# Patient Record
Sex: FEMALE | Race: WHITE | NOT HISPANIC OR LATINO | Employment: UNEMPLOYED | URBAN - METROPOLITAN AREA
[De-identification: names, ages, dates, MRNs, and addresses within clinical notes are randomized per-mention and may not be internally consistent; named-entity substitution may affect disease eponyms.]

---

## 2021-11-30 ENCOUNTER — TRANSCRIBE ORDERS (OUTPATIENT)
Dept: SPEECH THERAPY | Facility: HOSPITAL | Age: 46
End: 2021-11-30

## 2021-11-30 DIAGNOSIS — Z46.89 WHEELCHAIR FITTING OR ADJUSTMENT: Primary | ICD-10-CM

## 2021-12-08 ENCOUNTER — HOSPITAL ENCOUNTER (OUTPATIENT)
Dept: PHYSICAL THERAPY | Facility: HOSPITAL | Age: 46
Setting detail: THERAPIES SERIES
Discharge: HOME | End: 2021-12-08

## 2021-12-08 DIAGNOSIS — Z74.09 IMPAIRED FUNCTIONAL MOBILITY, BALANCE, GAIT, AND ENDURANCE: ICD-10-CM

## 2021-12-08 DIAGNOSIS — R29.6 FREQUENT FALLS: ICD-10-CM

## 2021-12-08 DIAGNOSIS — Z46.89 FITTING AND ADJUSTMENT OF WHEELCHAIR: Primary | ICD-10-CM

## 2021-12-08 DIAGNOSIS — R26.89 IMBALANCE: ICD-10-CM

## 2021-12-08 DIAGNOSIS — M54.50 LOW BACK PAIN, UNSPECIFIED BACK PAIN LATERALITY, UNSPECIFIED CHRONICITY, UNSPECIFIED WHETHER SCIATICA PRESENT: ICD-10-CM

## 2021-12-08 PROCEDURE — 97162 PT EVAL MOD COMPLEX 30 MIN: CPT

## 2021-12-08 NOTE — THERAPY EVALUATION
"    .Outpatient Physical Therapy Neuro Initial Evaluation  [facility]     Patient Name: Kimberley Andrade  : 1975  MRN: [id number]  Today's Date: 2021      Visit Date: 2021    There is no problem list on file for this patient.       Past Medical History:   Diagnosis Date   • Arthritis    • COPD (chronic obstructive pulmonary disease) ([facility])    • Depression    • Diabetes mellitus ([facility])    • Hypertension         Past Surgical History:   Procedure Laterality Date   •  SECTION      x 3    • HERNIA REPAIR      per pt \"4 times\"         Visit Dx:     ICD-10-CM ICD-9-CM   1. Fitting and adjustment of wheelchair  Z46.89 V53.8   2. Imbalance  R26.89 781.2   3. Frequent falls  R29.6 V15.88   4. Impaired functional mobility, balance, gait, and endurance  Z74.09 V49.89   5. Low back pain, unspecified back pain laterality, unspecified chronicity, unspecified whether sciatica present  M54.50 724.2        Patient History     Row Name 21 0935             History    Chief Complaint Balance Problems; Difficulty Walking; Difficulty with daily activities; Falls/history of falls; Fatigue/poor endurance; Impaired sensation; Muscle weakness  -      Date Current Problem(s) Began 10/25/21  referral date  -      Brief Description of Current Complaint Pt is referred to PT for wheelchair mobility assessment. She reports decreased mobility for several years and pt reports she struggles with her ADL's. Per pt her medical history includes low back pain, arthritis, painful knees, COPD with recent diagnosis of sleep apnea, diabetes with neuropathy, history of \"nerve damage L LE from epidural\" with childbirth. Pt reports at least 16 falls this year at home due to loss of balance.  -GERMAN      Patient/Caregiver Goals Improve mobility  obtain power wheelchair/scooter  -GERMAN      Hand Dominance right-handed  -GERMAN      Occupation/sports/leisure activities Pt has not worked since   -GERMAN              Pain     Pain Location " "Back; Knee  low back and B knees  -GERMAN      Pain at Present 7  low back and R knee; 3-4 L knee  -GERMAN      Tolerance Time- Standing Pt reports limited standing time due to pain especially with ADLs and reports she sits to cook and wash dishes.  -GERMAN              Fall Risk Assessment    Any falls in the past year: Yes  -GERMAN      Number of falls reported in the last 12 months 16  -GERMAN      Factors that contributed to the fall: Lost balance; Didn't use assistive device; Fatigue  -GERMAN      Does patient have a fear of falling Yes (comment)  -GERMAN              Daily Activities    Primary Language English  -GERMAN      Are you able to read Yes  -GERMAN      Are you able to write Yes  -GERMAN      How does patient learn best? Listening  -GERMAN      Teaching needs identified Falls Prevention; Home Safety  -GERMAN      Patient is concerned about/has problems with Climbing Stairs; Difficulty with self care (i.e. bathing, dressing, toileting:; Performing home management (household chores, shopping, care of dependents); Repetitive movements of the hand, arm, shoulder; Standing; Transfers (getting out of a chair, bed); Walking  -GERMAN      Does patient have problems with the following? Anxiety; Panic Attack  -GERMAN      Pt Participated in POC and Goals Yes  -GERMAN              Safety    Are you being hurt, hit, or frightened by anyone at home or in your life? No  -GERMAN      Are you being neglected by a caregiver No  -GERMAN            User Key  (r) = Recorded By, (t) = Taken By, (c) = Cosigned By    Initials Name Provider Type    Misty Carter PT Physical Therapist                     PT Neuro     Row Name 12/08/21 5827             Subjective Comments    Subjective Comments Pt reports she is fearful of falling. Pt c/o feeling \"dizzy\" with her describing it as lightheaded and off balance. She sits alot at home because she is afraid to get up. She said she sees a psychiatrist for her anxiety and \"PTSD\".  -GERMAN              Precautions and Contraindications    " "Precautions/Limitations fall precautions  -GERMAN              Subjective Pain    Able to rate subjective pain? yes  -GERMAN      Pre-Treatment Pain Level 7  -GERMAN      Post-Treatment Pain Level 7  -GERMAN      Subjective Pain Comment low back pain and R knee; L knee \"3-4\"  -GERMAN              Home Living    Current Living Arrangements home/apartment/condo  Pt lives in a 2 story town home with  and son. She sleeps on first floor recliner or couch because of difficulty getting up/down stairs to 2nd floor bedroom.  -GERMAN      Home Accessibility wheelchair accessible; stairs within home  stairs to 2nd floor bedroom; no steps to enter.  -GERMAN              Vision-Basic Assessment    Current Vision No visual deficits  -GERMAN              Cognition    Overall Cognitive Status WFL  -GERMAN      Memory Appears intact  -GERMAN      Orientation Level Oriented X4  -GERMAN      Safety Judgment Good awareness of safety precautions  -GERMAN      Deficits Fully aware of deficits  -GERMAN              Sensation    Light Touch Partial deficits in the LLE  lateral side of L LE from \"nerve damage from epidural\"  -GERMAN      Additional Comments Pt c/o B hands \"going numb\" at different times of the day.  -GERMAN              Posture/Observations    Alignment Options Forward head; Rounded shoulders; Lumbar lordosis  -GERMAN      Forward Head Mild; Moderate  -GERMAN      Rounded Shoulders Moderate  -GERMAN      Lumbar lordosis Decreased; Mild; Moderate; Sitting posture  -GERMAN      Posture/Observations Comments Pt is obese female with stated weight of ~ 331 lbs. She was pushed up to rehab waiting room in transport chair by her son.  -GERMAN              General ROM    GENERAL ROM COMMENTS AROM WFL all extremities except in sitting hip flexion ~ 1/4 range due to large abdominal girth.  -GERMAN              MMT (Manual Muscle Testing)    General MMT Comments B UE's 4/5 strength. B LE's hip flexion 3-/5, B knee flexion 3+/5, B knee extension 4/5, R ankle DF and inversion/eversion 3+/5, L ankle DF and " inversion/eversion 4/5  -GERMAN              Bed Mobility    Comment (Bed Mobility) Pt said she sleeps in recliner or on couch because she cannot get upstairs to her bedroom.  -GERMAN              Transfers    Bed-Chair [place] (Transfers) standby assist  -GERMAN      Chair-Bed [place] (Transfers) standby assist  -GERMAN      Transfers, Bed-Chair-Bed, Assist Device --  stand pivot  -GERMAN      Sit-Stand [place] (Transfers) standby assist  -GERMAN      Stand-Sit [place] (Transfers) standby assist  -GERMAN      Transfers, Sit-Stand-Sit, Assist Device rolling walker  -GERMAN      Transfer, Safety Issues balance decreased during turns; weight-shifting ability decreased  -GERMAN      Transfer, Impairments strength decreased; impaired balance; pain  -GERMAN      Comment (Transfers) To stand from armchair and armless chair using UE's to assist slow and struggles.  -GERMAN              Gait/Stairs (Locomotion)    [place] Level (Gait) standby assist; modified independence  -GERMAN      Assistive Device (Gait) walker, front-wheeled  -GERMAN      Distance in Feet (Gait) 20', 10'  -GERMAN      Pattern (Gait) step-through  -GERMAN      Deviations/Abnormal Patterns (Gait) bilateral deviations; base of support, wide; weight shifting decreased; stride length decreased; gait speed decreased  -GERMAN      Bilateral Gait Deviations forward flexed posture; heel strike decreased  SOA with ambulation  -GERMAN      Comment (Gait/Stairs) see Bgetti and TUG  -GERMAN            User Key  (r) = Recorded By, (t) = Taken By, (c) = Cosigned By    Initials Name Provider Type    Misty Carter PT Physical Therapist                        Therapy Education  Education Details: Discussed safety at home to prevent falls and possible safer type of mobility for pt.  Given: Symptoms/condition management, Fall prevention and home safety  How Provided: Verbal  Provided to: Patient  Level of Understanding: Verbalized     PT OP Goals     Row Name 12/08/21 8281          Long Term Goals    LTG  "Date to Achieve 01/06/22  -GERMAN     LTG 1 Pt to be evaluated for appropriate wheeled mobility device to allow pt to be independent in her home environment.  -GERMAN            Time Calculation    PT Goal Re-Cert Due Date 01/06/22  -GERMAN           User Key  (r) = Recorded By, (t) = Taken By, (c) = Cosigned By    Initials Name Provider Type    Misty Carter, PT Physical Therapist                 PT Assessment/Plan     Row Name 12/08/21 1529          PT Assessment    Functional Limitations Decreased safety during functional activities; Impaired gait; Limitation in home management; Limitations in community activities; Limitations in functional capacity and performance; Performance in leisure activities; Performance in self-care ADL  -GERMAN     Impairments Balance; Endurance; Gait; Muscle strength; Pain; Sensation  -GERMAN     Assessment Comments Pt is a 46 y/o female referred to PT for wheelchair mobility assessment who reports decreased mobility for several years. Per pt, her medical history includes low back pain, arthritis, painful knees, COPD with recent diagnosis of sleep apnea, diabetes with neuropathy, history of \"nerve damage L LE from epidural\" with childbirth. Pt reports she struggles with her ADL's. Pt reports at least 16 falls this year at home due to loss of balance. Pt has some weakness of B hip flexors. Pt's stated weight is ~ 331 lbs. Pt has deficits with ambulation and balance as indicated by the low scores on the Tinetti balance test. Her time on the TUG was low. The outcome measures indicate a high risk for falls. Pt is unable to timely or safely use an upright device (cane or walker) for mobility inside her home. Next treatment session will be utilized as a followup to the initial evaluation to assess pt's ability for wheeled mobility with a durable medical [employer] representative.  -GERMAN     Please refer to paper survey for additional self-reported information Yes  -GERMAN     Rehab Potential Good  -GERMAN     " "Patient/caregiver participated in establishment of treatment plan and goals Yes  -GERMAN     Patient would benefit from skilled therapy intervention Yes  -GERMAN            PT Plan    Predicted Duration of Therapy Intervention (PT) see for one additional visit  -GERMAN     Planned CPT's? PT EVAL MOD COMPLELITY: 22311; PT WHEELCHAIR MGMT EA 15 MIN: 89737  -GERMAN     Physical Therapy Interventions (Optional Details) wheelchair management/propulsion training  -           User Key  (r) = Recorded By, (t) = Taken By, (c) = Cosigned By    Initials Name Provider Type    Misty Carter, PT Physical Therapist                    OP Exercises     Row Name 12/08/21 0935             Subjective Comments    Subjective Comments Pt reports she is fearful of falling. Pt c/o feeling \"dizzy\" with her describing it as lightheaded and off balance. She sits alot at home because she is afraid to get up. She said she sees a psychiatrist for her anxiety and \"PTSD\".  -GERMNA              Subjective Pain    Able to rate subjective pain? yes  -GERMAN      Pre-Treatment Pain Level 7  -GERMAN      Post-Treatment Pain Level 7  -GERMAN      Subjective Pain Comment low back pain and R knee; L knee \"3-4\"  -            User Key  (r) = Recorded By, (t) = Taken By, (c) = Cosigned By    Initials Name Provider Type    Misty Carter, PT Physical Therapist                            Outcome Measure Options: Tinetti, Timed Up and Go (TUG)  Tinetti Assessment  Tinetti Assessment: yes  Sitting Balance: Steady,safe  Arises: Able, uses arms  Attempts to Rise: Able in 1 attempt  Immediate Standing Balance (first 5 sec): Steady without support  Standing Balance: Steady, stance > 4 inch CHRISSIE & requires support  Sternal Nudge (feet close together): Begins to fall  Eyes Closed (feet close together): Steady  Turning 360 Degrees- Steps: Discontinuous steps  Turning 360 Degrees- Steadiness: Unsteady (staggers, grabs)  Sitting Down: Uses arms or not a smooth motion  Tinetti Balance " "Score: 9  Gait Initiation (immediate after told \"go\"): No hesitancy  Step Length- Right Swing: Right swing foot passes Left stance leg  Step Length- Left Swing: Left swing foot passes Right  Foot Clearance- Right Foot: Right foot completely clears floor  Foot Clearance- Left Foot: Left foot completely clears floor  Step Symmetry: Right and Left step length equal  Step Continuity: Steps appear continuous  Path (excursion): Mild/moderate deviation or use of aid  Trunk: Marked sway or uses device  Base of Support: Heels apart  Gait Score: 8  Tinetti Total Score: 17  Tinetti Assistive Device: rolling walker  Timed Up and Go (TUG)  TUG Test 1: 31 seconds (using rolling walker; SOA after ambulation)    Time Calculation:   Start Time: 0935  Stop Time: 1020  Time Calculation (min): 45 min  Total Timed Code Minutes- PT: 45 minute(s)  Untimed Charges  PT Eval/Re-eval Minutes: 45  Total Minutes  Untimed Charges Total Minutes: 45   Total Minutes: 45   Therapy Charges for Today     Code Description Service Date Service Provider Modifiers Qty    23054405780 HC PT EVAL MOD COMPLEXITY 4 12/8/2021 Misty Mariano, PT GP 1          PT G-Codes  Outcome Measure Options: Tinetti, Timed Up and Go (TUG)  Tinetti Total Score: 17  TUG Test 1: 31 seconds (using rolling walker; SOA after ambulation)     Patient was wearing a face mask during this therapy encounter. Therapist used appropriate personal protective equipment including mask and gloves.  Mask used was standard procedure mask. Appropriate PPE was worn during the entire therapy session. Hand hygiene was completed before and after therapy session. Patient is not in enhanced droplet precautions.         Misty Mariano, PT  12/8/2021     "

## 2021-12-16 ENCOUNTER — APPOINTMENT (OUTPATIENT)
Dept: PHYSICAL THERAPY | Facility: HOSPITAL | Age: 46
End: 2021-12-16

## 2021-12-27 ENCOUNTER — HOSPITAL ENCOUNTER (OUTPATIENT)
Dept: PHYSICAL THERAPY | Facility: HOSPITAL | Age: 46
Setting detail: THERAPIES SERIES
Discharge: HOME | End: 2021-12-27

## 2021-12-27 DIAGNOSIS — R29.6 FREQUENT FALLS: ICD-10-CM

## 2021-12-27 DIAGNOSIS — M54.50 LOW BACK PAIN, UNSPECIFIED BACK PAIN LATERALITY, UNSPECIFIED CHRONICITY, UNSPECIFIED WHETHER SCIATICA PRESENT: ICD-10-CM

## 2021-12-27 DIAGNOSIS — Z74.09 IMPAIRED FUNCTIONAL MOBILITY, BALANCE, GAIT, AND ENDURANCE: ICD-10-CM

## 2021-12-27 DIAGNOSIS — Z46.89 FITTING AND ADJUSTMENT OF WHEELCHAIR: Primary | ICD-10-CM

## 2021-12-27 DIAGNOSIS — R26.89 IMBALANCE: ICD-10-CM

## 2021-12-27 PROCEDURE — 97542 WHEELCHAIR MNGMENT TRAINING: CPT

## 2021-12-27 NOTE — THERAPY DISCHARGE NOTE
"      Outpatient Physical Therapy Neuro Treatment Note/Discharge Summary  [facility]     Patient Name: Kimberley Andrade  : 1975  MRN: [id number]  Today's Date: 2021      Visit Date: 2021    Visit Dx:    ICD-10-CM ICD-9-CM   1. Fitting and adjustment of wheelchair  Z46.89 V53.8   2. Imbalance  R26.89 781.2   3. Frequent falls  R29.6 V15.88   4. Impaired functional mobility, balance, gait, and endurance  Z74.09 V49.89   5. Low back pain, unspecified back pain laterality, unspecified chronicity, unspecified whether sciatica present  M54.50 724.2       There is no problem list on file for this patient.            PT Neuro     Row Name 21 1500             Subjective Comments    Subjective Comments Pt again reiterated her fear of walking because she has had several falls and c/o \"dizziness\". \"Today [birth year] my birthday.\"  -GERMAN              Precautions and Contraindications    Precautions/Limitations fall precautions  -GERMAN              Subjective Pain    Able to rate subjective pain? yes  -GERMAN      Pre-Treatment Pain Level 7  -GERMAN      Post-Treatment Pain Level 7  -GERMAN      Subjective Pain Comment Pre Pain: Low back pain radiating down L LE to foot; post w/c propulsion: B knee pain when attempted to propel chair with LE's (feet on floor) and \"7\" pain B shoulders and upper arms.  -GERMAN              Cognition    Overall Cognitive Status WFL  -GERMAN      Memory Appears intact  -GERMAN      Orientation Level Oriented X4  -GERMAN      Safety Judgment Good awareness of safety precautions  -GERMAN      Deficits Fully aware of deficits  -GERMAN              Posture/Observations    Forward Head Mild; Moderate  -GERMAN      Rounded Shoulders Moderate  -GERMAN      Lumbar lordosis Decreased; Mild; Moderate; Sitting posture  -GERMAN      Posture/Observations Comments Pt is obese female with reports new stated weight of ~ 352 lbs since PT initial evaluation. Pt states she was weighed at a doctor's appt since PT evaluation. She was pushed up to rehab " "waiting room in transport chair by her son.  -GERMAN              Mobility    Additional Documentation Wheelchair Mobility/Management (Group)  -GERMAN              Transfers    Bed-Chair [place] (Transfers) contact guard  -GERMAN      Chair-Bed [place] (Transfers) contact guard  -GERMAN      Transfers, Bed-Chair-Bed, Assist Device --  stand piovt  -GERMAN      Sit-Stand [place] (Transfers) contact guard  -GERMAN      Stand-Sit [place] (Transfers) contact guard  -GERMAN      Transfer, Safety Issues balance decreased during turns; weight-shifting ability decreased  -GERMAN      Transfer, Impairments strength decreased; impaired balance; pain  -GERMAN      Comment (Transfers) Pt struggles and is slow to transfer chair to chair  -GERMAN              Wheelchair Mobility/Management    Mobility Activities (Wheelchair) forward propulsion; steering; turning  -GERMAN      Forward Propulsion [place] (Wheelchair) standby assist  -GERMAN      Steering [place] (Wheelchair) standby assist  -GERMAN      Turning [place] (Wheelchair) contact guard; minimum assist (75% patient effort)  slow and difficult  -GERMAN      Distance Propelled in Feet (Wheelchair) Pt propelled an optimally configured manual wheelchair 18' on low pile carpet in 1 minute 5 seconds (65 seconds total) with great labor and complaints of increased pain in B knees and B shoulders and upper arms. Pain was not present in shoulders and upper arms initially but was rated \"7\" following w/c propulsion.  -GERMAN            User Key  (r) = Recorded By, (t) = Taken By, (c) = Cosigned By    Initials Name Provider Type    Misty Carter, PT Physical Therapist                         PT Assessment/Plan     Row Name 12/27/21 5300          PT Assessment    Assessment Comments Based on the Initial Evaluation (including the Tinetti balance test and the TUG), pt is not able to safely and timely use an upright mobility device. Pt struggled and labored to transfer out of wheelchair. Pt was only able to " "propel an optimally configured manual wheelchair 18' in 1 minute and 5 seconds (65 seconds total) due to increased pain of B shoulders and upper arms and B knees. Due to pt's difficulty with transfers and her obesity, transfers in and out of a scooter would not be safe. An upright mobility device (cane or walker), optimally configured manual wheelchair and a scooter are not safe or timely for this pt to use for ADL's. Therefore, a Ciarra HD with captain's seat is recommended. The pt has the mental capabilities to operate a power wheelchair safely. The Ciarra HD power wheelchair will allow the pt to remain independent in her home environment by helping her to complete her ADL's. RENATO Weiss, a [employer]  was present to aid in the determination of appropriate mobility device.  -GERMAN           User Key  (r) = Recorded By, (t) = Taken By, (c) = Cosigned By    Initials Name Provider Type    Misty Carter, PT Physical Therapist                      OP Exercises     Row Name 12/27/21 1500             Subjective Comments    Subjective Comments Pt again reiterated her fear of walking because she has had several falls and c/o \"dizziness\". \"Today is my birthday.\"  -GERMAN              Subjective Pain    Able to rate subjective pain? yes  -GERMAN      Pre-Treatment Pain Level 7  -GERMAN      Post-Treatment Pain Level 7  -GERMAN      Subjective Pain Comment Pre Pain: Low back pain radiating down L LE to foot; post w/c propulsion: B knee pain when attempted to propel chair with LE's (feet on floor) and \"7\" pain B shoulders and upper arms.  -GERMAN            User Key  (r) = Recorded By, (t) = Taken By, (c) = Cosigned By    Initials Name Provider Type    Misty Carter, PT Physical Therapist                               PT OP Goals     Row Name 12/27/21 1500          Long Term Goals    LTG 1 Pt to be evaluated for appropriate wheeled mobility device to allow pt to be independent in her home environment.  -GERMAN     LTG 1 " Progress Met  -GERMAN           User Key  (r) = Recorded By, (t) = Taken By, (c) = Cosigned By    Initials Name Provider Type    Misty Carter, PT Physical Therapist                Therapy Education  Education Details: Education on various wheeled mobility devices.  Given: Mobility training, Fall prevention and home safety  How Provided: Verbal  Provided to: Patient, Caregiver  Level of Understanding: Verbalized            Time Calculation:   Start Time: 1500  Stop Time: 1542  Time Calculation (min): 42 min  Total Timed Code Minutes- PT: 42 minute(s)  Timed Charges  66199 - Wheelchair Management Trainin  Total Minutes  Timed Charges Total Minutes: 42   Total Minutes: 42     Therapy Charges for Today     Code Description Service Date Service Provider Modifiers Qty    04290461588 HC PT WHEELCHAIR MGMT EA 15 MIN 2021 Misty Mariano, PT GP 3                OP PT Discharge Summary  Date of Discharge: 21  Reason for Discharge: All goals achieved  Outcomes Achieved: Able to achieve all goals within established timeline  Discharge Destination: Home without follow-up    Patient was wearing a face mask during this therapy encounter. Therapist used appropriate personal protective equipment including mask and gloves.  Mask used was standard procedure mask. Appropriate PPE was worn during the entire therapy session. Hand hygiene was completed before and after therapy session. Patient is not in enhanced droplet precautions.         Misty Mariano, PT  2021

## 2022-01-25 ENCOUNTER — CONSULT (OUTPATIENT)
Dept: BARIATRICS/WEIGHT MGMT | Facility: CLINIC | Age: 47
End: 2022-01-25

## 2022-01-25 VITALS
RESPIRATION RATE: 18 BRPM | HEART RATE: 72 BPM | BODY MASS INDEX: 47.09 KG/M2 | HEIGHT: 66 IN | DIASTOLIC BLOOD PRESSURE: 78 MMHG | SYSTOLIC BLOOD PRESSURE: 128 MMHG | OXYGEN SATURATION: 96 % | WEIGHT: 293 LBS | TEMPERATURE: 97.4 F

## 2022-01-25 DIAGNOSIS — Z01.818 PRE-OP EVALUATION: ICD-10-CM

## 2022-01-25 DIAGNOSIS — G47.33 OSA (OBSTRUCTIVE SLEEP APNEA): ICD-10-CM

## 2022-01-25 DIAGNOSIS — E66.9 DIABETES MELLITUS TYPE 2 IN OBESE: ICD-10-CM

## 2022-01-25 DIAGNOSIS — I10 ESSENTIAL HYPERTENSION: ICD-10-CM

## 2022-01-25 DIAGNOSIS — E78.5 DYSLIPIDEMIA: ICD-10-CM

## 2022-01-25 DIAGNOSIS — K21.9 GASTROESOPHAGEAL REFLUX DISEASE, UNSPECIFIED WHETHER ESOPHAGITIS PRESENT: ICD-10-CM

## 2022-01-25 DIAGNOSIS — E11.69 DIABETES MELLITUS TYPE 2 IN OBESE: ICD-10-CM

## 2022-01-25 DIAGNOSIS — E66.01 MORBID OBESITY WITH BMI OF 50.0-59.9, ADULT: Primary | ICD-10-CM

## 2022-01-25 PROBLEM — Z87.891 QUIT SMOKING WITHIN PAST YEAR: Status: ACTIVE | Noted: 2022-01-25

## 2022-01-25 PROBLEM — E28.2 PCOS (POLYCYSTIC OVARIAN SYNDROME): Status: ACTIVE | Noted: 2022-01-25

## 2022-01-25 PROBLEM — H81.09 MENIERE DISEASE: Status: ACTIVE | Noted: 2022-01-25

## 2022-01-25 PROBLEM — Z86.19 HISTORY OF HELICOBACTER PYLORI INFECTION: Status: ACTIVE | Noted: 2022-01-25

## 2022-01-25 PROBLEM — R12 HEARTBURN: Status: ACTIVE | Noted: 2022-01-25

## 2022-01-25 PROBLEM — M25.50 MULTIPLE JOINT PAIN: Status: ACTIVE | Noted: 2022-01-25

## 2022-01-25 PROBLEM — F41.9 ANXIETY: Status: ACTIVE | Noted: 2022-01-25

## 2022-01-25 PROBLEM — Z86.718 HISTORY OF DVT (DEEP VEIN THROMBOSIS): Status: ACTIVE | Noted: 2022-01-25

## 2022-01-25 PROBLEM — Z71.3 DIETARY COUNSELING: Status: ACTIVE | Noted: 2022-01-25

## 2022-01-25 PROBLEM — R12 HEARTBURN: Status: RESOLVED | Noted: 2022-01-25 | Resolved: 2022-01-25

## 2022-01-25 PROBLEM — K76.0 FATTY LIVER: Status: ACTIVE | Noted: 2022-01-25

## 2022-01-25 PROCEDURE — 99205 OFFICE O/P NEW HI 60 MIN: CPT | Performed by: NURSE PRACTITIONER

## 2022-01-25 RX ORDER — ONDANSETRON 4 MG/1
4 TABLET, ORALLY DISINTEGRATING ORAL EVERY 8 HOURS PRN
COMMUNITY
Start: 2021-11-03 | End: 2023-02-21

## 2022-01-25 RX ORDER — ATORVASTATIN CALCIUM 80 MG/1
80 TABLET, FILM COATED ORAL DAILY
COMMUNITY
Start: 2022-01-20

## 2022-01-25 RX ORDER — HYDROXYZINE HYDROCHLORIDE 25 MG/1
25 TABLET, FILM COATED ORAL DAILY
COMMUNITY
End: 2022-10-11 | Stop reason: SDUPTHER

## 2022-01-25 RX ORDER — DAPAGLIFLOZIN 10 MG/1
TABLET, FILM COATED ORAL
COMMUNITY
Start: 2022-01-09 | End: 2022-10-11

## 2022-01-25 RX ORDER — FUROSEMIDE 20 MG/1
20 TABLET ORAL DAILY
Status: ON HOLD | COMMUNITY
Start: 2022-01-09 | End: 2022-10-25 | Stop reason: SDUPTHER

## 2022-01-25 RX ORDER — PHENOL 1.4 %
AEROSOL, SPRAY (ML) MUCOUS MEMBRANE
COMMUNITY
End: 2022-10-11 | Stop reason: SDUPTHER

## 2022-01-25 RX ORDER — CARVEDILOL 12.5 MG/1
12.5 TABLET ORAL 2 TIMES DAILY WITH MEALS
COMMUNITY
Start: 2021-12-15

## 2022-01-25 RX ORDER — LIRAGLUTIDE 6 MG/ML
INJECTION SUBCUTANEOUS
COMMUNITY
Start: 2021-12-08 | End: 2022-01-25

## 2022-01-25 RX ORDER — CALCIUM CARBONATE 200(500)MG
1 TABLET,CHEWABLE ORAL DAILY
COMMUNITY

## 2022-01-25 RX ORDER — HYDROXYZINE 50 MG/1
50 TABLET, FILM COATED ORAL
COMMUNITY
Start: 2022-01-10

## 2022-01-25 RX ORDER — LOSARTAN POTASSIUM 100 MG/1
100 TABLET ORAL EVERY EVENING
COMMUNITY
Start: 2021-12-20

## 2022-01-25 NOTE — PROGRESS NOTES
[facility]AT[facility] BARIATRIC SURGERY  [address] [address]  [phone]  [address] [id number] [phone]  Dept: [phone]  1/25/2022      Kimberley Andrade.  [id number]  [id number]  1975  female      Chief Complaint of weight gain; unable to maintain weight loss    History of Present Illness:   Kimberley is a 46 y.o. female who presents today for evaluation, education and consultation regarding weight loss surgery. The patient is interested in the sleeve gastrectomy.      Diet History:Kimberley has been overweight for at least 20 years, has been 35 pounds or more overweight for at least 20+ years, has been 100 pounds or more overweight for 20 or more years and started dieting at age 17.  The most weight Kimberley lost was 125 pounds on reduced calorie and maintained the weight loss for a short period. Kimberley describes her eating habits as volume eater- but patient states she has been doing intermittent fasting. Kimberley Andrade has tried Weight Watchers, reduced calorie, exercising and DASH among others with success of losing up to 125 pounds, but in each instance regained the weight.     See dietician documentation for complete history.    Bariatric Surgery Evaluation: The patient is being seen for an initial visit for bariatric surgery evaluation.     Bariatric Co-morbidities:  sleep apnea, diabetes, hypertension, dyslipidemia, knee pain, GERD, previous DVT, polycystic ovarian disease and mental health disease    Patient Active Problem List   Diagnosis   • Vitamin D deficiency   • Essential hypertension   • Morbid obesity with BMI of 50.0-59.9, adult ([facility])   • Dietary counseling   • Pre-op evaluation   • Diabetes mellitus type 2 in obese ([facility])   • Meniere disease   • Dyslipidemia   • ANNI (obstructive sleep apnea)   • Anxiety   • PCOS (polycystic ovarian syndrome)   • History of DVT (deep vein thrombosis)   • History of Helicobacter pylori  infection   • Fatty liver   • GERD (gastroesophageal reflux disease)   • Multiple joint pain   • Quit smoking within past year       Past Medical History:   Diagnosis Date   • Arthritis    • Asthma    • Colon polyp    • COPD (chronic obstructive pulmonary disease) (HCC)    • Depression    • Diabetes mellitus (HCC)    • Hypertension        Past Surgical History:   Procedure Laterality Date   • AXILLARY HIDRADENITIS EXCISION      multiple   •  SECTION      x 3    • COLONOSCOPY     • DILATATION AND CURETTAGE     • HYSTERECTOMY     • INCISIONAL HERNIA REPAIR      recurent hernia repair   • MOUTH SURGERY     • TUBAL ABDOMINAL LIGATION     • VENTRAL HERNIA REPAIR         Allergies   Allergen Reactions   • Clindamycin Swelling     Lip swelling  Lip swelling  Lip swelling  Lip swelling     • Codeine    • Erythromycin    • Ibuprofen    • Vancomycin    • Adhesive Tape Rash         Current Outpatient Medications:   •  aspirin 81 MG chewable tablet, Chew 81 mg daily., Disp: , Rfl:   •  atorvastatin (LIPITOR) 80 MG tablet, , Disp: , Rfl:   •  calcium carbonate (TUMS) 500 MG chewable tablet, Chew 1 tablet Daily., Disp: , Rfl:   •  carvedilol (COREG) 12.5 MG tablet, , Disp: , Rfl:   •  cetirizine (zyrTEC) 10 MG tablet, Take 10 mg by mouth Daily., Disp: , Rfl:   •  citalopram (CeleXA) 40 MG tablet, Take 40 mg by mouth daily., Disp: , Rfl:   •  clonazePAM (KlonoPIN) 1 MG tablet, Take 1 mg by mouth 2 (two) times a day as needed for seizures., Disp: , Rfl:   •  Farxiga 10 MG tablet, , Disp: , Rfl:   •  furosemide (LASIX) 20 MG tablet, Take 20 mg by mouth Daily., Disp: , Rfl:   •  hydrOXYzine (ATARAX) 25 MG tablet, Take 25 mg by mouth Daily., Disp: , Rfl:   •  hydrOXYzine (ATARAX) 50 MG tablet, Take 50 mg by mouth every night at bedtime., Disp: , Rfl:   •  losartan (COZAAR) 100 MG tablet, , Disp: , Rfl:   •  Melatonin 10 MG tablet, Take  by mouth., Disp: , Rfl:   •  metFORMIN (GLUCOPHAGE) 1000 MG tablet, Take 1,000 mg by mouth  2 (two) times a day with meals., Disp: , Rfl:   •  ondansetron ODT (ZOFRAN-ODT) 4 MG disintegrating tablet, DISSOLVE 1 TABLET ON THE TONGUE THREE TIMES DAILY FOR 5 DAYS AS NEEDED FOR NAUSEA OR VOMITING, Disp: , Rfl:   •  vitamin D3 125 MCG (5000 UT) capsule capsule, , Disp: , Rfl:   •  ciprofloxacin-dexamethasone (CIPRODEX) 0.3-0.1 % otic suspension, Administer 4 drops to the right ear 2 (Two) Times a Day., Disp: 7.5 mL, Rfl: 0    Social History     Socioeconomic History   • Marital status:    • Number of children: 3   Tobacco Use   • Smoking status: Former Smoker     Packs/day: 0.50     Quit date: 2022     Years since quittin.0   • Smokeless tobacco: Never Used   • Tobacco comment: patient just quit smoking 5 days ago   Substance and Sexual Activity   • Alcohol use: Yes     Comment: rare   • Drug use: Never   • Sexual activity: Defer       Family History   Problem Relation Age of Onset   • Obesity Mother    • Diabetes Mother    • Lung cancer Mother    • Obesity Father    • Obesity Maternal Grandmother    • Stroke Maternal Grandmother    • Heart attack Maternal Grandmother    • Hypertension Paternal Grandmother    • Stroke Paternal Grandmother    • Heart attack Paternal Grandmother          Review of Systems:  Review of Systems   Musculoskeletal: Positive for gait problem.   Neurological: Positive for dizziness.   All other systems reviewed and are negative.      Physical Exam:  Vital Signs:  Weight: (!) 161 kg (354 lb 8 oz)   Body mass index is 58.09 kg/m².  Temp: 97.4 °F (36.3 °C)   Heart Rate: 72   BP: 128/78     Physical Exam  Vitals reviewed.   Constitutional:       Appearance: Normal appearance. She is well-developed. She is obese.   HENT:      Head: Normocephalic and atraumatic.   Cardiovascular:      Rate and Rhythm: Normal rate.   Pulmonary:      Effort: Pulmonary effort is normal.      Breath sounds: Normal breath sounds.   Abdominal:      General: Bowel sounds are normal. There is no  distension.      Palpations: Abdomen is soft.      Tenderness: There is no abdominal tenderness.   Musculoskeletal:         General: Normal range of motion.   Skin:     General: Skin is warm and dry.   Neurological:      Mental Status: She is alert and oriented to person, place, and time.   Psychiatric:         Behavior: Behavior normal.         Thought Content: Thought content normal.         Judgment: Judgment normal.            Assessment:         Kimberley Andrade is a 46 y.o. year old female with medically complicated severe obesity. Weight: (!) 161 kg (354 lb 8 oz), Body mass index is 58.09 kg/m². and weight related problems including sleep apnea, diabetes, hypertension, dyslipidemia, knee pain, GERD, previous DVT, polycystic ovarian disease and mental health disease.    I explained in detail, potential surgical options of interest to the patient including the RNY gastric bypass, sleeve gastrectomy, and gastric band while considering the patient's medical history. At this time, the patient expressed interest in the sleeve gastrectomy.  All of those procedures can be performed laparoscopically but there is a chance to convert to open if any technical challenges or complications do occur.  Bariatric surgery is not cosmetic surgery but rather a tool to help a patient make a life-long commitment lifestyle changes including diet, exercise, behavior changes, and taking supplemental vitamins and minerals. The risks, benefits, alternatives, and potential complications of all of the procedures were explained in detail including but not limited to failure to lose weight or gain weight and change in body image, metabolic complications. The patient was informed that surgery is a tool and requires lifestyle change including dietary modification to be successful. The patient was made aware of the need for vitamin supplementation after surgery due to the risk of deficiencies including but not limited to calcium, thiamine,  vitamin B12, folate, iron, and anemia.    The patient was advised to start a high protein, low fat and low carbohydrate diet. The patient was given individualized information by our dietician along with handouts. The patient was encouraged to start routine exercise including but not limited to 150 minutes per week. The patient received a resistance band along with a handout of exercises.     The patient was given information regarding the GARTH educational video. GARTH is an internet based educational video which explains the surgical procedure and answers basic questions regarding the procedure. The patient was provided with instructions and a password to watch the video.    Due to the patient's BMI, history and co-morbidities they are at a high risk for surgery and will obtain the following:  -The patient has been advised that a letter of medical support and a history and physical must be obtained from her primary care physician. The patient was given a copy of a sample form, that will suffice as their letter to take to their primary are provider.  -A referral for pre-operative psychological evaluation was ordered for the patient to evaluate candidacy as well as provide mental health support, should it be warranted before or after surgery.  -Pre-operative testing will be ordered to include: CBC, CMP,TSH and HgbA1C will be drawn, CXR, EKG. Previous results of testing were reviewed including (cannot see last h pylori results), CT, previous EKG, notes from hernia repairs. Discussed with the patient the importance of smoking cessation, the time frame for quitting and the risks involved with smoking after bariatric surgery; patient understands they will be tested prior to surgery to verify cessation.    Kimberley Andrade will obtain a pre-operative clearance from cardiologist prior to surgery- had recent stress test/work up. Patient will obtain blood thinner clearance.  -Kimberley Andrade will be set up for a pre-operative  diagnostic esophagogastroduodenoscopy with biopsy for evaluation. The risks and benefits of the procedure were discussed with the patient in detail and all questions were answered.  Possibility of perforation, bleeding, aspiration, anoxic brain injury, respiratory and/or cardiac arrest and death were discussed. The patient received handouts regarding her instructions and all questions were answered.    The patient understands the surgical procedures and the different surgical options that are available.  She understands the lifestyle changes that would be required after surgery and has agreed to participate in a pre-operative and postoperative weight management program.  She also expressed understanding of possible risks, had several questions answered and desires to proceed.    I think she is a good candidate for this surgery, and is interested in a sleeve gastrectomy.    Encounter Diagnoses   Name Primary?   • Morbid obesity with BMI of 50.0-59.9, adult ([facility]) Yes   • Diabetes mellitus type 2 in obese ([facility])    • Essential hypertension    • Dyslipidemia    • ANNI (obstructive sleep apnea)    • Gastroesophageal reflux disease, unspecified whether esophagitis present    • Pre-op evaluation        Plan:    The consultation plan was reviewed with the patient.  Patient will have evaluations and follow up with bariatric dieticians and a psychologist before undergoing a multidisciplinary review of her candidacy.  We also discussed the weight loss requirement and rationale, and other program requirements.    Total encounter exceeded 60+ minutes including reviewing their chart/outside medical records/previous visits, face to face time obtaining medical history and physical, reviewing surgical options and answering any questions, discussing pre-operative plan and requirements along with care coordination.         Robina Lui, APRN  1/25/2022

## 2022-01-31 DIAGNOSIS — Z01.818 PRE-OP EVALUATION: ICD-10-CM

## 2022-01-31 DIAGNOSIS — E66.9 DIABETES MELLITUS TYPE 2 IN OBESE: ICD-10-CM

## 2022-01-31 DIAGNOSIS — K21.9 GASTROESOPHAGEAL REFLUX DISEASE, UNSPECIFIED WHETHER ESOPHAGITIS PRESENT: ICD-10-CM

## 2022-01-31 DIAGNOSIS — G47.33 OSA (OBSTRUCTIVE SLEEP APNEA): ICD-10-CM

## 2022-01-31 DIAGNOSIS — E66.01 MORBID OBESITY WITH BMI OF 50.0-59.9, ADULT: ICD-10-CM

## 2022-01-31 DIAGNOSIS — I10 ESSENTIAL HYPERTENSION: ICD-10-CM

## 2022-01-31 DIAGNOSIS — E11.69 DIABETES MELLITUS TYPE 2 IN OBESE: ICD-10-CM

## 2022-01-31 DIAGNOSIS — E78.5 DYSLIPIDEMIA: ICD-10-CM

## 2022-04-06 DIAGNOSIS — E78.5 DYSLIPIDEMIA: ICD-10-CM

## 2022-04-06 DIAGNOSIS — E66.9 DIABETES MELLITUS TYPE 2 IN OBESE: ICD-10-CM

## 2022-04-06 DIAGNOSIS — E11.69 DIABETES MELLITUS TYPE 2 IN OBESE: ICD-10-CM

## 2022-04-06 DIAGNOSIS — Z01.818 PRE-OP EVALUATION: ICD-10-CM

## 2022-04-06 DIAGNOSIS — I10 ESSENTIAL HYPERTENSION: ICD-10-CM

## 2022-04-06 DIAGNOSIS — E66.01 MORBID OBESITY WITH BMI OF 50.0-59.9, ADULT: ICD-10-CM

## 2022-04-06 DIAGNOSIS — K21.9 GASTROESOPHAGEAL REFLUX DISEASE, UNSPECIFIED WHETHER ESOPHAGITIS PRESENT: ICD-10-CM

## 2022-04-06 DIAGNOSIS — G47.33 OSA (OBSTRUCTIVE SLEEP APNEA): ICD-10-CM

## 2022-04-26 DIAGNOSIS — E66.01 MORBID OBESITY WITH BMI OF 50.0-59.9, ADULT: Primary | ICD-10-CM

## 2022-04-26 DIAGNOSIS — Z01.818 PRE-OP EVALUATION: ICD-10-CM

## 2022-04-26 DIAGNOSIS — R93.89 ABNORMAL CHEST X-RAY: ICD-10-CM

## 2022-05-18 ENCOUNTER — TRANSCRIBE ORDERS (OUTPATIENT)
Dept: ADMINISTRATIVE | Facility: HOSPITAL | Age: 47
End: 2022-05-18

## 2022-05-18 DIAGNOSIS — Z01.818 OTHER SPECIFIED PRE-OPERATIVE EXAMINATION: Primary | ICD-10-CM

## 2022-05-18 DIAGNOSIS — Z01.818 PREOP TESTING: Primary | ICD-10-CM

## 2022-05-19 ENCOUNTER — TELEPHONE (OUTPATIENT)
Dept: BARIATRICS/WEIGHT MGMT | Facility: CLINIC | Age: 47
End: 2022-05-19

## 2022-05-19 NOTE — TELEPHONE ENCOUNTER
Left message to patient call us back  ----- Message from PAIGE Fermin sent at 4/26/2022  2:08 PM EDT -----  Lets have patient repeat her CXR just to be safe. I will put in order.

## 2022-05-21 ENCOUNTER — LAB (OUTPATIENT)
Dept: LAB | Facility: HOSPITAL | Age: 47
End: 2022-05-21

## 2022-05-21 DIAGNOSIS — Z01.818 OTHER SPECIFIED PRE-OPERATIVE EXAMINATION: ICD-10-CM

## 2022-05-21 DIAGNOSIS — Z01.818 PREOP TESTING: ICD-10-CM

## 2022-05-21 LAB — SARS-COV-2 ORF1AB RESP QL NAA+PROBE: NOT DETECTED

## 2022-05-21 PROCEDURE — U0004 COV-19 TEST NON-CDC HGH THRU: HCPCS | Performed by: NURSE PRACTITIONER

## 2022-05-21 PROCEDURE — C9803 HOPD COVID-19 SPEC COLLECT: HCPCS

## 2022-05-24 ENCOUNTER — ANESTHESIA EVENT (OUTPATIENT)
Dept: GASTROENTEROLOGY | Facility: HOSPITAL | Age: 47
End: 2022-05-24

## 2022-05-24 ENCOUNTER — ANESTHESIA (OUTPATIENT)
Dept: GASTROENTEROLOGY | Facility: HOSPITAL | Age: 47
End: 2022-05-24

## 2022-05-24 ENCOUNTER — HOSPITAL ENCOUNTER (OUTPATIENT)
Facility: HOSPITAL | Age: 47
Setting detail: HOSPITAL OUTPATIENT SURGERY
Discharge: HOME | End: 2022-05-24
Attending: SURGERY | Admitting: SURGERY

## 2022-05-24 VITALS
HEIGHT: 65 IN | BODY MASS INDEX: 48.82 KG/M2 | SYSTOLIC BLOOD PRESSURE: 100 MMHG | DIASTOLIC BLOOD PRESSURE: 51 MMHG | HEART RATE: 65 BPM | RESPIRATION RATE: 16 BRPM | TEMPERATURE: 97.9 F | OXYGEN SATURATION: 99 % | WEIGHT: 293 LBS

## 2022-05-24 DIAGNOSIS — E66.01 MORBID OBESITY WITH BMI OF 50.0-59.9, ADULT: ICD-10-CM

## 2022-05-24 DIAGNOSIS — K21.9 GASTROESOPHAGEAL REFLUX DISEASE, UNSPECIFIED WHETHER ESOPHAGITIS PRESENT: ICD-10-CM

## 2022-05-24 DIAGNOSIS — Z01.818 PRE-OP EVALUATION: ICD-10-CM

## 2022-05-24 PROBLEM — K44.9 HIATAL HERNIA: Status: ACTIVE | Noted: 2022-05-24

## 2022-05-24 LAB — GLUCOSE BLDC GLUCOMTR-MCNC: 129 MG/DL (ref 70–130)

## 2022-05-24 PROCEDURE — 88305 TISSUE EXAM BY PATHOLOGIST: CPT | Performed by: SURGERY

## 2022-05-24 PROCEDURE — 43239 EGD BIOPSY SINGLE/MULTIPLE: CPT | Performed by: SURGERY

## 2022-05-24 PROCEDURE — 82962 GLUCOSE BLOOD TEST: CPT

## 2022-05-24 PROCEDURE — 25010000002 PROPOFOL 10 MG/ML EMULSION: Performed by: ANESTHESIOLOGY

## 2022-05-24 RX ORDER — PROPOFOL 10 MG/ML
VIAL (ML) INTRAVENOUS AS NEEDED
Status: DISCONTINUED | OUTPATIENT
Start: 2022-05-24 | End: 2022-05-24 | Stop reason: SURG

## 2022-05-24 RX ORDER — SODIUM CHLORIDE, SODIUM LACTATE, POTASSIUM CHLORIDE, CALCIUM CHLORIDE 600; 310; 30; 20 MG/100ML; MG/100ML; MG/100ML; MG/100ML
30 INJECTION, SOLUTION INTRAVENOUS CONTINUOUS PRN
Status: DISCONTINUED | OUTPATIENT
Start: 2022-05-24 | End: 2022-05-24 | Stop reason: HOSPADM

## 2022-05-24 RX ORDER — GLYCOPYRROLATE 0.2 MG/ML
INJECTION INTRAMUSCULAR; INTRAVENOUS AS NEEDED
Status: DISCONTINUED | OUTPATIENT
Start: 2022-05-24 | End: 2022-05-24 | Stop reason: SURG

## 2022-05-24 RX ORDER — LIDOCAINE HYDROCHLORIDE 20 MG/ML
INJECTION, SOLUTION INFILTRATION; PERINEURAL AS NEEDED
Status: DISCONTINUED | OUTPATIENT
Start: 2022-05-24 | End: 2022-05-24 | Stop reason: SURG

## 2022-05-24 RX ORDER — PANTOPRAZOLE SODIUM 40 MG/1
40 TABLET, DELAYED RELEASE ORAL DAILY
Qty: 30 TABLET | Refills: 5 | Status: SHIPPED | OUTPATIENT
Start: 2022-05-24 | End: 2022-12-12

## 2022-05-24 RX ADMIN — SODIUM CHLORIDE, POTASSIUM CHLORIDE, SODIUM LACTATE AND CALCIUM CHLORIDE 30 ML/HR: 600; 310; 30; 20 INJECTION, SOLUTION INTRAVENOUS at 06:52

## 2022-05-24 RX ADMIN — PROPOFOL 200 MG: 10 INJECTION, EMULSION INTRAVENOUS at 07:30

## 2022-05-24 RX ADMIN — GLYCOPYRROLATE 0.2 MG: 0.2 INJECTION INTRAMUSCULAR; INTRAVENOUS at 07:30

## 2022-05-24 RX ADMIN — LIDOCAINE HYDROCHLORIDE 60 MG: 20 INJECTION, SOLUTION INFILTRATION; PERINEURAL at 07:30

## 2022-05-24 NOTE — OP NOTE
Surgeon: Tai Ribeiro Jr., M.D.    Preoperative Diagnosis: GERD with history of hiatal hernia and Helicobacter pylori    Postoperative Diagnosis: #1 gastritis #2 LA grade A esophagitis #3 small hiatal hernia    Procedure Performed: Transoral esophagogastroduodenoscopy with gastric antral and distal esophageal biopsies    Indications: 46-year female who presents for preoperative valuation.  Patient does not take H2 blocker or PPI megabase but does take Tums daily.  Patient with history of hiatal hernia and Helicobacter pylori.    Procedure:     The procedure, indications, preparation and potential complications were explained to the patient, who indicated understanding and signed the corresponding consent forms.  The patient was identified, taken to the endoscopy suite, and placed on the left side down decubitus position.  The patient underwent a MAC anesthesia and was appropriately monitored through the case by the anesthesia personnel with continuous pulse oximetry, blood pressure, and cardiac monitoring.  A bite block was placed.  After adequate IV sedation and using a transoral technique a lubed flexible endoscope was placed in the hypopharynx and advanced to the second portion of the duodenum without difficulty. The scope was then withdrawn back into the antrum of the stomach.  Cold forcep biopsies of the antrum were taken to rule out Helicobacter pylori.  The scope was retroflexed noting the body, fundus and cardia.  The scope was then withdrawn back into the esophagus after decompressing the stomach.  The Z line was noted and GE junction measured from the incisors.  The scope was then completely withdrawn.  The patient tolerated the procedure well and left the endoscopy suite in stable condition.  The findings are listed below.    Duodenum: Unremarkable  Antrum: Moderate patchy erythema  Body/Fundus: Unremarkable  Cardia: Small defect  Esophagus: Z-line irregular with 2 areas of erythema extending proximal  less than 5 mm.  Multiple cold forcep biopsies taken to rule out Arreola's.  No bleeding noted    Recommendations:     Will start PPI and await biopsy results and follow-up in the office

## 2022-05-24 NOTE — H&P
Patient Care Team:  Lucila Tirado MD as PCP - General (Family Medicine)    Chief complaint Need of preoperative clearance prior to surgery    Subjective     Patient is a 46 y.o. female who is a patient of ours and has undergone our extensive initial evaluation for bariatric surgery and needs to proceed with upper endoscopy for preoperative clearance prior to proceeding with surgery.  Please see the initial history and physical for further detailed information.      Review of Systems   Pertinent items are noted in HPI and no changes since last visit.    Past Medical History:   Diagnosis Date   • Arthritis    • Asthma    • Colon polyp    • COPD (chronic obstructive pulmonary disease) (HCC)    • Depression    • Diabetes mellitus (HCC)    • Hypertension      Past Surgical History:   Procedure Laterality Date   • AXILLARY HIDRADENITIS EXCISION      multiple   •  SECTION      x 3    • COLONOSCOPY     • DILATATION AND CURETTAGE     • HYSTERECTOMY     • INCISIONAL HERNIA REPAIR      recurent hernia repair   • MOUTH SURGERY     • TUBAL ABDOMINAL LIGATION     • VENTRAL HERNIA REPAIR       Family History   Problem Relation Age of Onset   • Obesity Mother    • Diabetes Mother    • Lung cancer Mother    • Obesity Father    • Obesity Maternal Grandmother    • Stroke Maternal Grandmother    • Heart attack Maternal Grandmother    • Hypertension Paternal Grandmother    • Stroke Paternal Grandmother    • Heart attack Paternal Grandmother      Social History     Tobacco Use   • Smoking status: Former Smoker     Packs/day: 0.50     Quit date: 2022     Years since quittin.3   • Smokeless tobacco: Never Used   • Tobacco comment: patient just quit smoking 5 days ago   Substance Use Topics   • Alcohol use: Yes     Comment: rare   • Drug use: Never     Medications Prior to Admission   Medication Sig Dispense Refill Last Dose   • aspirin 81 MG chewable tablet Chew 81 mg daily.   2022 at Unknown time  "  • atorvastatin (LIPITOR) 80 MG tablet    5/23/2022 at Unknown time   • calcium carbonate (TUMS) 500 MG chewable tablet Chew 1 tablet Daily.   5/23/2022 at Unknown time   • carvedilol (COREG) 12.5 MG tablet    5/24/2022 at Unknown time   • cetirizine (zyrTEC) 10 MG tablet Take 10 mg by mouth Daily.   5/23/2022 at Unknown time   • ciprofloxacin-dexamethasone (CIPRODEX) 0.3-0.1 % otic suspension Administer 4 drops to the right ear 2 (Two) Times a Day. 7.5 mL 0 Past Month at Unknown time   • citalopram (CeleXA) 40 MG tablet Take 40 mg by mouth daily.   5/23/2022 at Unknown time   • clonazePAM (KlonoPIN) 1 MG tablet Take 1 mg by mouth 2 (two) times a day as needed for seizures.   5/23/2022 at Unknown time   • Farxiga 10 MG tablet    5/23/2022 at Unknown time   • furosemide (LASIX) 20 MG tablet Take 20 mg by mouth Daily.   5/23/2022 at Unknown time   • hydrOXYzine (ATARAX) 25 MG tablet Take 25 mg by mouth Daily.   5/23/2022 at Unknown time   • hydrOXYzine (ATARAX) 50 MG tablet Take 50 mg by mouth every night at bedtime.   5/23/2022 at Unknown time   • losartan (COZAAR) 100 MG tablet    5/23/2022 at Unknown time   • Melatonin 10 MG tablet Take  by mouth.   5/23/2022 at Unknown time   • metFORMIN (GLUCOPHAGE) 1000 MG tablet Take 1,000 mg by mouth 2 (two) times a day with meals.   5/23/2022 at Unknown time   • vitamin D3 125 MCG (5000 UT) capsule capsule    5/23/2022 at Unknown time   • ondansetron ODT (ZOFRAN-ODT) 4 MG disintegrating tablet DISSOLVE 1 TABLET ON THE TONGUE THREE TIMES DAILY FOR 5 DAYS AS NEEDED FOR NAUSEA OR VOMITING   More than a month at Unknown time     Allergies:  Clindamycin, Codeine, Erythromycin, Ibuprofen, Vancomycin, and Adhesive tape    Vital Signs  See PreOp record  Temp:  [97.8 °F (36.6 °C)] 97.8 °F (36.6 °C)  Resp:  [16] 16    Flowsheet Rows    Flowsheet Row First Filed Value   Admission Height 165.1 cm (65\") Documented at 05/24/2022 0634   Admission Weight 154 kg (339 lb 14.4 oz) Documented " at 05/24/2022 0634           Physical Exam:   Heart: RR  Lungs: CTA B  Abd: soft and NT/ND  Ext: no clubbing, cyanosis    Results Review:    I have reviewed the patient's clinical results      Morbid obesity with BMI of 50.0-59.9, adult (HCC)    Pre-op evaluation    GERD (gastroesophageal reflux disease)      The risks and benefits of the procedure were discussed with the patient in detail and all questions were answered.  Possibility of perforation, bleeding, aspiration, anoxic brain injury, respiratory and/or cardiac arrest and death were discussed.  Consent will be signed and witnessed..     Tai Ribeiro MD  05/24/22  06:37 EDT  Time: Approximately 15 minutes was spent with the patient.  All of the patients questions were answered.

## 2022-05-24 NOTE — DISCHARGE INSTRUCTIONS
For the next 24 hours patient needs to be with a responsible adult.    For 24 hours DO NOT drive, operate machinery, appliances, drink alcohol, make important decisions or sign legal documents.    Start with a light or bland diet if you are feeling sick to your stomach otherwise advance to regular diet as tolerated.    Follow recommendations on procedure report if provided by your doctor.    Call Dr LUZ for problems [phone]    Problems may include but not limited to: large amounts of bleeding, trouble breathing, repeated vomiting, severe unrelieved pain, fever or chills.

## 2022-05-24 NOTE — ANESTHESIA POSTPROCEDURE EVALUATION
"Patient: Kimberley Andarde    Procedure Summary     Date: 05/24/22 Room / Location:  [facility]    Anesthesia Start: 0728 Anesthesia Stop: 0738    Procedure: ESOPHAGOGASTRODUODENOSCOPY WITH BIOPSY (N/A Esophagus) Diagnosis:       Morbid obesity with BMI of 50.0-59.9, adult ([facility])      Gastroesophageal reflux disease, unspecified whether esophagitis present      Pre-op evaluation      (Morbid obesity with BMI of 50.0-59.9, adult (HCC) [E66.01, Z68.43])      (Gastroesophageal reflux disease, unspecified whether esophagitis present [K21.9])      (Pre-op evaluation [Z01.818])    Surgeons: Tai Ribeiro Jr., MD Provider: Chino Waddell MD    Anesthesia Type: MAC ASA Status: 4          Anesthesia Type: MAC    Vitals  Vitals Value Taken Time   /51 05/24/22 0806   Temp 36.6 °C (97.9 °F) 05/24/22 0753   Pulse 65 05/24/22 0806   Resp 16 05/24/22 0806   SpO2 99 % 05/24/22 0806           Post Anesthesia Care and Evaluation    Patient location during evaluation: PACU  Patient participation: complete - patient participated  Level of consciousness: awake  Pain score: 0  Pain management: adequate  Airway patency: patent  Anesthetic complications: No anesthetic complications  PONV Status: none  Cardiovascular status: acceptable  Respiratory status: acceptable  Hydration status: acceptable    Comments: /51 (BP Location: Left arm, Patient Position: Lying)   Pulse 65   Temp 36.6 °C (97.9 °F) (Oral)   Resp 16   Ht 165.1 cm (65\")   Wt (!) 154 kg (339 lb 14.4 oz)   SpO2 99%   BMI 56.56 kg/m²       "

## 2022-05-26 ENCOUNTER — TELEPHONE (OUTPATIENT)
Dept: BARIATRICS/WEIGHT MGMT | Facility: CLINIC | Age: 47
End: 2022-05-26

## 2022-05-26 LAB
LAB AP CASE REPORT: NORMAL
LAB AP DIAGNOSIS COMMENT: NORMAL
PATH REPORT.FINAL DX SPEC: NORMAL
PATH REPORT.GROSS SPEC: NORMAL

## 2022-05-26 NOTE — TELEPHONE ENCOUNTER
----- Message from PAIGE Fermin sent at 5/26/2022  3:21 PM EDT -----  This is good for cardiac clearance  ----- Message -----  From: Radha Duval CMA  Sent: 5/25/2022  11:20 AM EDT  To: PAIGE Fermin    Please review patients cardiac clearance and copy of her stress test.

## 2022-05-26 NOTE — TELEPHONE ENCOUNTER
Inform about results  EGD  ----- Message from Tai Ribeiro Jr., MD sent at 5/26/2022  7:42 AM EDT -----  Please call patient with negative results.

## 2022-07-12 ENCOUNTER — TELEPHONE (OUTPATIENT)
Dept: BARIATRICS/WEIGHT MGMT | Facility: CLINIC | Age: 47
End: 2022-07-12

## 2022-07-12 NOTE — TELEPHONE ENCOUNTER
----- Message from JEWELS Mckenna sent at 6/30/2022  2:22 PM EDT -----  Patient called back about talking to her PCP, PCP will get her in form CT on august

## 2022-10-11 ENCOUNTER — PREP FOR SURGERY (OUTPATIENT)
Dept: OTHER | Facility: HOSPITAL | Age: 47
End: 2022-10-11

## 2022-10-11 DIAGNOSIS — K44.9 HIATAL HERNIA: ICD-10-CM

## 2022-10-11 DIAGNOSIS — E66.01 CLASS 3 SEVERE OBESITY DUE TO EXCESS CALORIES WITH SERIOUS COMORBIDITY AND BODY MASS INDEX (BMI) OF 50.0 TO 59.9 IN ADULT: Primary | ICD-10-CM

## 2022-10-11 PROBLEM — E66.813 CLASS 3 SEVERE OBESITY DUE TO EXCESS CALORIES WITH SERIOUS COMORBIDITY AND BODY MASS INDEX (BMI) OF 50.0 TO 59.9 IN ADULT: Status: ACTIVE | Noted: 2022-10-11

## 2022-10-11 RX ORDER — SODIUM CHLORIDE, SODIUM LACTATE, POTASSIUM CHLORIDE, CALCIUM CHLORIDE 600; 310; 30; 20 MG/100ML; MG/100ML; MG/100ML; MG/100ML
100 INJECTION, SOLUTION INTRAVENOUS CONTINUOUS
Status: CANCELLED | OUTPATIENT
Start: 2022-10-24

## 2022-10-11 RX ORDER — METOCLOPRAMIDE HYDROCHLORIDE 5 MG/ML
10 INJECTION INTRAMUSCULAR; INTRAVENOUS ONCE
Status: CANCELLED | OUTPATIENT
Start: 2022-10-24 | End: 2022-10-11

## 2022-10-11 RX ORDER — PROMETHAZINE HYDROCHLORIDE 12.5 MG/1
12.5 TABLET ORAL ONCE
Status: CANCELLED | OUTPATIENT
Start: 2022-10-24 | End: 2022-10-11

## 2022-10-11 RX ORDER — ATORVASTATIN CALCIUM 80 MG/1
80 TABLET, FILM COATED ORAL
COMMUNITY
Start: 2021-03-30 | End: 2022-10-11 | Stop reason: SDUPTHER

## 2022-10-11 RX ORDER — ACETAMINOPHEN,DIPHENHYDRAMINE HCL 500; 25 MG/1; MG/1
1 TABLET, FILM COATED ORAL NIGHTLY
COMMUNITY

## 2022-10-11 RX ORDER — CHLORHEXIDINE GLUCONATE 0.12 MG/ML
15 RINSE ORAL SEE ADMIN INSTRUCTIONS
Status: CANCELLED | OUTPATIENT
Start: 2022-10-24

## 2022-10-11 RX ORDER — CEFAZOLIN SODIUM IN 0.9 % NACL 3 G/100 ML
3 INTRAVENOUS SOLUTION, PIGGYBACK (ML) INTRAVENOUS
Status: CANCELLED | OUTPATIENT
Start: 2022-10-24

## 2022-10-11 RX ORDER — GABAPENTIN 250 MG/5ML
300 SOLUTION ORAL ONCE
Status: CANCELLED | OUTPATIENT
Start: 2022-10-24 | End: 2022-10-11

## 2022-10-11 RX ORDER — PHENOL 1.4 %
10 AEROSOL, SPRAY (ML) MUCOUS MEMBRANE NIGHTLY PRN
COMMUNITY

## 2022-10-11 RX ORDER — BENZONATATE 100 MG/1
100 CAPSULE ORAL 3 TIMES DAILY PRN
COMMUNITY
Start: 2022-07-25 | End: 2022-10-13

## 2022-10-11 RX ORDER — PROMETHAZINE HYDROCHLORIDE 25 MG/1
25 SUPPOSITORY RECTAL ONCE
Status: CANCELLED | OUTPATIENT
Start: 2022-10-24

## 2022-10-11 RX ORDER — LANCING DEVICE
EACH MISCELLANEOUS SEE ADMIN INSTRUCTIONS
COMMUNITY
Start: 2022-09-15

## 2022-10-11 RX ORDER — ANTIARTHRITIC COMBINATION NO.2 900 MG
TABLET ORAL
COMMUNITY
Start: 2022-07-27 | End: 2022-10-13

## 2022-10-11 RX ORDER — SODIUM CHLORIDE 0.9 % (FLUSH) 0.9 %
3 SYRINGE (ML) INJECTION EVERY 12 HOURS SCHEDULED
Status: CANCELLED | OUTPATIENT
Start: 2022-10-11

## 2022-10-11 RX ORDER — UREA 10 %
1 LOTION (ML) TOPICAL DAILY
COMMUNITY
End: 2022-10-13 | Stop reason: SDUPTHER

## 2022-10-11 RX ORDER — LOSARTAN POTASSIUM 100 MG/1
100 TABLET ORAL
COMMUNITY
Start: 2022-05-20 | End: 2022-10-11 | Stop reason: SDUPTHER

## 2022-10-11 RX ORDER — PANTOPRAZOLE SODIUM 40 MG/10ML
40 INJECTION, POWDER, LYOPHILIZED, FOR SOLUTION INTRAVENOUS ONCE
Status: CANCELLED | OUTPATIENT
Start: 2022-10-24 | End: 2022-10-11

## 2022-10-11 RX ORDER — SODIUM CHLORIDE 0.9 % (FLUSH) 0.9 %
3-10 SYRINGE (ML) INJECTION AS NEEDED
Status: CANCELLED | OUTPATIENT
Start: 2022-10-24

## 2022-10-11 RX ORDER — GLUCOSAMINE HCL/CHONDROITIN SU 500-400 MG
CAPSULE ORAL
COMMUNITY
Start: 2022-09-15

## 2022-10-11 RX ORDER — SCOLOPAMINE TRANSDERMAL SYSTEM 1 MG/1
1 PATCH, EXTENDED RELEASE TRANSDERMAL CONTINUOUS
Status: CANCELLED | OUTPATIENT
Start: 2022-10-24 | End: 2022-10-27

## 2022-10-13 ENCOUNTER — CONSULT (OUTPATIENT)
Dept: BARIATRICS/WEIGHT MGMT | Facility: CLINIC | Age: 47
End: 2022-10-13

## 2022-10-13 ENCOUNTER — LAB (OUTPATIENT)
Dept: LAB | Facility: HOSPITAL | Age: 47
End: 2022-10-13

## 2022-10-13 VITALS
DIASTOLIC BLOOD PRESSURE: 83 MMHG | SYSTOLIC BLOOD PRESSURE: 171 MMHG | RESPIRATION RATE: 18 BRPM | TEMPERATURE: 98.2 F | HEART RATE: 86 BPM | BODY MASS INDEX: 47.09 KG/M2 | HEIGHT: 66 IN | WEIGHT: 293 LBS

## 2022-10-13 DIAGNOSIS — K21.9 GASTROESOPHAGEAL REFLUX DISEASE, UNSPECIFIED WHETHER ESOPHAGITIS PRESENT: ICD-10-CM

## 2022-10-13 DIAGNOSIS — E66.9 DIABETES MELLITUS TYPE 2 IN OBESE: ICD-10-CM

## 2022-10-13 DIAGNOSIS — I10 ESSENTIAL HYPERTENSION: ICD-10-CM

## 2022-10-13 DIAGNOSIS — G47.33 OSA (OBSTRUCTIVE SLEEP APNEA): ICD-10-CM

## 2022-10-13 DIAGNOSIS — M25.50 MULTIPLE JOINT PAIN: ICD-10-CM

## 2022-10-13 DIAGNOSIS — Z87.891 QUIT SMOKING WITHIN PAST YEAR: ICD-10-CM

## 2022-10-13 DIAGNOSIS — E66.01 CLASS 3 SEVERE OBESITY DUE TO EXCESS CALORIES WITH SERIOUS COMORBIDITY AND BODY MASS INDEX (BMI) OF 50.0 TO 59.9 IN ADULT: Primary | ICD-10-CM

## 2022-10-13 DIAGNOSIS — K44.9 HIATAL HERNIA: ICD-10-CM

## 2022-10-13 DIAGNOSIS — E11.69 DIABETES MELLITUS TYPE 2 IN OBESE: ICD-10-CM

## 2022-10-13 DIAGNOSIS — Z86.19 HISTORY OF HELICOBACTER PYLORI INFECTION: ICD-10-CM

## 2022-10-13 DIAGNOSIS — Z71.3 DIETARY COUNSELING: ICD-10-CM

## 2022-10-13 DIAGNOSIS — E28.2 PCOS (POLYCYSTIC OVARIAN SYNDROME): ICD-10-CM

## 2022-10-13 DIAGNOSIS — Z86.718 HISTORY OF DVT (DEEP VEIN THROMBOSIS): ICD-10-CM

## 2022-10-13 DIAGNOSIS — K76.0 FATTY LIVER: ICD-10-CM

## 2022-10-13 DIAGNOSIS — E66.01 CLASS 3 SEVERE OBESITY DUE TO EXCESS CALORIES WITH SERIOUS COMORBIDITY AND BODY MASS INDEX (BMI) OF 50.0 TO 59.9 IN ADULT: ICD-10-CM

## 2022-10-13 DIAGNOSIS — E78.5 DYSLIPIDEMIA: ICD-10-CM

## 2022-10-13 PROBLEM — Z01.818 PRE-OP EVALUATION: Status: RESOLVED | Noted: 2022-01-25 | Resolved: 2022-10-13

## 2022-10-13 LAB
ALBUMIN SERPL-MCNC: 4.6 G/DL (ref 3.5–5.2)
ALBUMIN/GLOB SERPL: 1.6 G/DL
ALP SERPL-CCNC: 136 U/L (ref 39–117)
ALT SERPL W P-5'-P-CCNC: 31 U/L (ref 1–33)
ANION GAP SERPL CALCULATED.3IONS-SCNC: 15.3 MMOL/L (ref 5–15)
AST SERPL-CCNC: 22 U/L (ref 1–32)
BILIRUB SERPL-MCNC: 0.8 MG/DL (ref 0–1.2)
BUN SERPL-MCNC: 11 MG/DL (ref 6–20)
BUN/CREAT SERPL: 13.1 (ref 7–25)
CALCIUM SPEC-SCNC: 9.5 MG/DL (ref 8.6–10.5)
CHLORIDE SERPL-SCNC: 99 MMOL/L (ref 98–107)
CO2 SERPL-SCNC: 21.7 MMOL/L (ref 22–29)
CREAT SERPL-MCNC: 0.84 MG/DL (ref 0.57–1)
DEPRECATED RDW RBC AUTO: 39.5 FL (ref 37–54)
EGFRCR SERPLBLD CKD-EPI 2021: 86.4 ML/MIN/1.73
ERYTHROCYTE [DISTWIDTH] IN BLOOD BY AUTOMATED COUNT: 13.2 % (ref 12.3–15.4)
GLOBULIN UR ELPH-MCNC: 2.8 GM/DL
GLUCOSE SERPL-MCNC: 121 MG/DL (ref 65–99)
HCT VFR BLD AUTO: 45.5 % (ref 34–46.6)
HGB BLD-MCNC: 15.5 G/DL (ref 12–15.9)
MCH RBC QN AUTO: 28.2 PG (ref 26.6–33)
MCHC RBC AUTO-ENTMCNC: 34.1 G/DL (ref 31.5–35.7)
MCV RBC AUTO: 82.7 FL (ref 79–97)
PLATELET # BLD AUTO: 258 10*3/MM3 (ref 140–450)
PMV BLD AUTO: 10.1 FL (ref 6–12)
POTASSIUM SERPL-SCNC: 4 MMOL/L (ref 3.5–5.2)
PROT SERPL-MCNC: 7.4 G/DL (ref 6–8.5)
RBC # BLD AUTO: 5.5 10*6/MM3 (ref 3.77–5.28)
SODIUM SERPL-SCNC: 136 MMOL/L (ref 136–145)
WBC NRBC COR # BLD: 12.77 10*3/MM3 (ref 3.4–10.8)

## 2022-10-13 PROCEDURE — 99215 OFFICE O/P EST HI 40 MIN: CPT | Performed by: SURGERY

## 2022-10-13 PROCEDURE — 85027 COMPLETE CBC AUTOMATED: CPT

## 2022-10-13 PROCEDURE — 80053 COMPREHEN METABOLIC PANEL: CPT

## 2022-10-13 PROCEDURE — 36415 COLL VENOUS BLD VENIPUNCTURE: CPT

## 2022-10-13 RX ORDER — ENOXAPARIN SODIUM 100 MG/ML
40 INJECTION SUBCUTANEOUS EVERY 12 HOURS SCHEDULED
Qty: 11.2 ML | Refills: 0 | Status: SHIPPED | OUTPATIENT
Start: 2022-10-13 | End: 2022-10-27

## 2022-10-13 RX ORDER — URSODIOL 300 MG/1
300 CAPSULE ORAL 2 TIMES DAILY
Qty: 60 CAPSULE | Refills: 5 | Status: SHIPPED | OUTPATIENT
Start: 2022-10-13

## 2022-10-13 NOTE — H&P
Bariatric Consult:  Referred by Lucila Tirado MD    Kimberley Andrade is here today for consult on Consult (Sleeve consultation)      History of Present Illness:     Kimberley Andrade is a 46 y.o. female with morbid obesity with co-morbidities including sleep apnea, diabetes, hypertension, dyslipidemia, knee pain, GERD, previous DVT, depression and tobacco use who presents for surgical consultation for the above procedure. Kimberley has completed the initial intake visit and has been examined by our nurse practitioner, dietician, psychologist and underwent the extensive educational teaching process under the guidance of our bariatric coordinator and myself. Kimberley also has seen or if has not yet will see the educational video GARTH on the surgical procedure if available. Kimberley attended today more educational teaching from our bariatric coordinator and myself. Kimberley has had an extensive medical workup including a visit with their primary care physician, EKG, chest radiograph, blood work, EGD or UGI and possibly further testing. These have been reviewed by me and discussed with the patient. Kimberley is now ready to proceed with surgery. Kimberley presently denies nausea, vomiting, fever, chills, chest pain, shortness of air, melena, hematochezia, hemetemesis, dysuria, frequency, hematuria.       Past Medical History:   Diagnosis Date   • Arthritis    • Asthma    • Colon polyp    • COPD (chronic obstructive pulmonary disease) ([facility])    • Depression    • Diabetes mellitus ([facility])    • Hypertension        Encounter Diagnoses   Name Primary?   • Class 3 severe obesity due to excess calories with serious comorbidity and body mass index (BMI) of 50.0 to 59.9 in adult ([facility]) Yes   • Diabetes mellitus type 2 in obese ([facility])    • PCOS (polycystic ovarian syndrome)    • History of DVT (deep vein thrombosis)    • Essential hypertension    • Dyslipidemia    • Quit smoking within past year    • ANNI (obstructive sleep apnea)    • Multiple joint pain     • Hiatal hernia    • Gastroesophageal reflux disease, unspecified whether esophagitis present    • Fatty liver    • History of Helicobacter pylori infection    • Dietary counseling        Past Surgical History:   Procedure Laterality Date   • AXILLARY HIDRADENITIS EXCISION      multiple   •  SECTION      x 3    • COLONOSCOPY     • DILATATION AND CURETTAGE     • ENDOSCOPY N/A 2022    Procedure: ESOPHAGOGASTRODUODENOSCOPY WITH BIOPSY;  Surgeon: Tai Ribeiro Jr., MD;  Location: [facility] ENDOSCOPY;  Service: General;  Laterality: N/A;  PRE- GERD, HIATAL HERNIA  POST- GASTRITIS, ESOPHAGITIS, HIATAL HERNIA   • HYSTERECTOMY     • INCISIONAL HERNIA REPAIR      recurent hernia repair   • MOUTH SURGERY     • TUBAL ABDOMINAL LIGATION     • VENTRAL HERNIA REPAIR         Patient Active Problem List   Diagnosis   • Vitamin D deficiency   • Essential hypertension   • Dietary counseling   • Diabetes mellitus type 2 in obese ([facility])   • Meniere disease   • Dyslipidemia   • ANNI (obstructive sleep apnea)   • Anxiety   • PCOS (polycystic ovarian syndrome)   • History of DVT (deep vein thrombosis)   • History of Helicobacter pylori infection   • Fatty liver   • GERD (gastroesophageal reflux disease)   • Multiple joint pain   • Quit smoking within past year   • Hiatal hernia   • Class 3 severe obesity due to excess calories with serious comorbidity and body mass index (BMI) of 50.0 to 59.9 in adult ([facility])       Allergies   Allergen Reactions   • Clindamycin Swelling     Lip swelling  Lip swelling  Lip swelling  Lip swelling     • Codeine    • Erythromycin    • Ibuprofen    • Vancomycin    • Adhesive Tape Rash         Current Outpatient Medications:   •  Alcohol Swabs 70 % pads, USE 1 FOUR TIMES DAILY, Disp: , Rfl:   •  aspirin 81 MG chewable tablet, Chew 81 mg daily., Disp: , Rfl:   •  atorvastatin (LIPITOR) 80 MG tablet, , Disp: , Rfl:   •  calcium carbonate (TUMS) 500 MG chewable tablet, Chew 1 tablet Daily., Disp: ,  Rfl:   •  carvedilol (COREG) 12.5 MG tablet, , Disp: , Rfl:   •  cetirizine (zyrTEC) 10 MG tablet, Take 10 mg by mouth Daily., Disp: , Rfl:   •  citalopram (CeleXA) 40 MG tablet, Take 40 mg by mouth daily., Disp: , Rfl:   •  clonazePAM (KlonoPIN) 1 MG tablet, Take 1 tablet by mouth 3 (Three) Times a Day., Disp: , Rfl:   •  diphenhydrAMINE-acetaminophen (TYLENOL PM)  MG tablet per tablet, Take 1 tablet by mouth Every Night., Disp: , Rfl:   •  furosemide (LASIX) 20 MG tablet, Take 20 mg by mouth Daily., Disp: , Rfl:   •  glucose blood test strip, Check sugar before each meal, x 30 days, ANY BRAND COVERED BY INSURANCE, Disp: , Rfl:   •  hydrOXYzine (ATARAX) 50 MG tablet, Take 50 mg by mouth every night at bedtime., Disp: , Rfl:   •  Lancet Devices (Lancing Device) misc, See Admin Instructions., Disp: , Rfl:   •  losartan (COZAAR) 100 MG tablet, , Disp: , Rfl:   •  Melatonin 10 MG tablet, Take 1 tablet by mouth., Disp: , Rfl:   •  metFORMIN (GLUCOPHAGE) 1000 MG tablet, Take 1,000 mg by mouth 2 (two) times a day with meals., Disp: , Rfl:   •  ondansetron ODT (ZOFRAN-ODT) 4 MG disintegrating tablet, DISSOLVE 1 TABLET ON THE TONGUE THREE TIMES DAILY FOR 5 DAYS AS NEEDED FOR NAUSEA OR VOMITING, Disp: , Rfl:   •  pantoprazole (PROTONIX) 40 MG EC tablet, Take 1 tablet by mouth Daily., Disp: 30 tablet, Rfl: 5  •  ProAir  (90 Base) MCG/ACT inhaler, INHALE 2 PUFFS BY MOUTH FOUR TIMES DAILY FOR 7 DAYS, Disp: , Rfl:   •  vitamin D3 125 MCG (5000 UT) capsule capsule, Take 1 capsule by mouth Daily., Disp: , Rfl:   •  Enoxaparin Sodium (LOVENOX) 40 MG/0.4ML solution prefilled syringe syringe, Inject 0.4 mL under the skin into the appropriate area as directed Every 12 (Twelve) Hours for 14 days. Start after surgery unless instructed otherwise, Disp: 11.2 mL, Rfl: 0  •  folic acid-vit B6-vit B12 (FOLBEE) 2.5-25-1 MG tablet tablet, Take 1 tablet by mouth Daily., Disp: 40 tablet, Rfl: 0  •  ursodiol (Actigall) 300 MG  capsule, Take 1 capsule by mouth 2 (Two) Times a Day., Disp: 60 capsule, Rfl: 5    Social History     Socioeconomic History   • Marital status:    • Number of children: 3   Tobacco Use   • Smoking status: Former     Packs/day: 0.50     Types: Cigarettes     Quit date: 2022     Years since quittin.7   • Smokeless tobacco: Never   • Tobacco comments:     patient just quit smoking 5 days ago   Substance and Sexual Activity   • Alcohol use: Yes     Comment: rare   • Drug use: Never   • Sexual activity: Defer       Family History   Problem Relation Age of Onset   • Obesity Mother    • Diabetes Mother    • Lung cancer Mother    • Obesity Father    • Obesity Maternal Grandmother    • Stroke Maternal Grandmother    • Heart attack Maternal Grandmother    • Hypertension Paternal Grandmother    • Stroke Paternal Grandmother    • Heart attack Paternal Grandmother        Review of Systems:  Review of Systems   Constitutional: Positive for fatigue.   Gastrointestinal: Positive for diarrhea.   Musculoskeletal: Positive for arthralgias.   All other systems reviewed and are negative.        Physical Exam:    Vital Signs:  Weight: (!) 153 kg (338 lb)   Body mass index is 55.37 kg/m².  Temp: 98.2 °F (36.8 °C)   Heart Rate: 86   BP: 171/83       Physical Exam  Vitals reviewed.   HENT:      Head: Normocephalic and atraumatic.      Mouth/Throat:      Mouth: Mucous membranes are moist.      Pharynx: Oropharynx is clear.   Eyes:      General: No scleral icterus.     Extraocular Movements: Extraocular movements intact.      Conjunctiva/sclera: Conjunctivae normal.      Pupils: Pupils are equal, round, and reactive to light.   Neck:      Thyroid: No thyromegaly.   Cardiovascular:      Rate and Rhythm: Normal rate.   Pulmonary:      Effort: Pulmonary effort is normal. No respiratory distress.      Breath sounds: Normal breath sounds. No stridor. No wheezing or rhonchi.   Abdominal:      General: Bowel sounds are normal.       Palpations: Abdomen is soft.      Tenderness: There is no abdominal tenderness. There is no right CVA tenderness, left CVA tenderness, guarding or rebound.      Hernia: No hernia is present.   Musculoskeletal:         General: Normal range of motion.      Cervical back: Normal range of motion and neck supple.      Right lower leg: Edema present.      Left lower leg: Edema present.   Lymphadenopathy:      Cervical: No cervical adenopathy.   Skin:     General: Skin is warm and dry.      Findings: No erythema.   Neurological:      Mental Status: She is alert and oriented to person, place, and time.   Psychiatric:         Mood and Affect: Mood normal.         Behavior: Behavior normal.         Thought Content: Thought content normal.         Judgment: Judgment normal.           Assessment:    Kimberley Andrade is a 46 y.o. year old female with medically complicated severe obesity with a BMI of Body mass index is 55.37 kg/m². and multiple co-morbidities listed in the encounter diagnosis.    I think she is an appropriate candidate for this surgery, and is ready to proceed.      Plan/Discussion/Summary:  Small hiatal hernia per me.  Patient does take PPI.  Esophagitis negative for Arreola's.  Patient status open ventral hernia repair with mesh and then laparoscopic repair of ventral hernia.  Patient understands increased risk of complications secondary to previous abdominal surgeries.    The patient has returned to the office for a surgical consultation and has requested to proceed with gastric sleeve.  I have had the opportunity to obtain a history, examine the patient and review the patient's chart. The procedure could be done laparoscopically and or robotically.    The patient understands that surgery is a tool and that weight loss is not guaranteed but only seen in the context of appropriate use, regular follow up, exercise and making appropriate food choices.     I personally discussed the potential complications of the  laparoscopic gastric sleeve with this patient.  The patient is well aware of potential complications of the surgery that include but not limited to bleeding, infections, deep vein thrombosis, pulmonary embolism, pulmonary complications such as pneumonia, cardiac event, hernias, small bowel obstruction, damage to the spleen or other organs, bowel injury, disfiguring scars, failure to lose weight, need for additional surgery, conversion to an open procedure and death.  The patient is also aware of complications which apply in particular to the gastric sleeve and can include but not limited to the leakage of gastric contents at the staple line, the development of an intra-abdominal abscess, gastroesophageal reflux disease, Arreola's esophagus, ulcers, vitamin/mineral deficiencies, strictures, and the possibility of converting this procedure to a Ramesh-en-Y gastric bypass. The patient also understands the possibility of requiring an acid reducer medication for the rest of their life.    The risks, benefits, potential complications and alternative therapies were discussed at great length as outlined in our extensive consent forms, online consent and educational teaching processes.    The patient has confirmed the participation in the programs extensive educational activities.    All questions and concerns were answered to patient's satisfaction.  The patient now wishes to proceed with surgery.     The patient has agreed to a postoperative course of anitcoagulant therapy.      I instructed patient that the surgery could be laparoscopically and/or robotically.    I instructed patient to start on a H2 blocker or proton pump inhibitor if not already on one of these medications.    I explained in detail the procedures that are in the consent.  All of these procedures have a chance to convert to open if any technical challenges or complications do occur.  Bariatric surgery is not cosmetic surgery but rather a tool to help a  patient make a life-long commitment lifestyle change including diet, exercise, behavior changes, and taking supplemental vitamins and minerals.    Problems after surgery may require more operations to correct them.    The risks, benefits, alternatives, and potential complications of all of the procedures were explained in detail including, but not limited to death, anesthesia and medication adverse effect, deep venous thrombosis, pulmonary embolism, trocar site/incisional hernia, wound infection, abdominal infection, bleeding, failure to lose weight, gain weight, a change in body image, metabolic complications with vitamin deficiences and anemia.    Weight loss expectations were discussed with the patient in detail. The weight loss operations most commonly performed are the sleeve gastrectomy and the Ramesh-en-Y gastric bypass. These operations result in weight losses up to approximately 25-35% of initial body weight 12 to 24 months after surgery with the gastric bypass usually the higher percent of weight loss but depends on patient using the tool.    For the gastric bypass and loop duodenal switch (CRISTIAN-S) the risks include but not limited to the following early complications:  Anastomotic leak/peritonitis, Ramesh/Alimentary/biliopancreatic limb obstruction, severe & minor wound infection/seroma, and nausea/vomiting.  Late complications can include but are not limited to malnutrition, vitamin deficiencies, frequent loose stools,  stomal stenosis, marginal ulcer, bowel obstruction, intussusception, internal, and incisional hernia.    Regarding the gastric sleeve, there is higher risk of dysphagia and reflux leading to possible Arreola's esophagus compared to a gastric bypass, as well as risk of internal visceral/organ injury, splenectomy, bleeding, infection, leak (which could require further intervention possible conversion to Ramesh-en-Y gastric bypass), stenosis and possibility of regaining weightBairon thomson  counseled regarding diagnostic results, instructions for management, risk factor reductions, prognosis, patient and family education, impressions, risks and benefits of treatment options and importance of compliance with treatment. Total time of the encounter was over 45 minutes counseling the patient regarding the procedure as above and reviewing as well as ordering labs, medications and the procedure.  The chart was also reviewed prior to seeing the patient reviewing previous testing, studies and labs.    Kimberley understands the surgical procedures and the different surgical options that are available.  She understands the lifestyle changes that are required after surgery and has agreed to follow the guidelines outlined in the weight management program.  She also expressed understanding of the risks involved and had all of female questions answered and desires to proceed.      Tai Ribeiro MD  10/13/2022

## 2022-10-13 NOTE — PATIENT INSTRUCTIONS
Bariatric Manual    You were provided a manual specific to the procedure that you have chosen.  Please refer to that with any questions or call the office at [phone]

## 2022-10-20 ENCOUNTER — PRE-ADMISSION TESTING (OUTPATIENT)
Dept: PREADMISSION TESTING | Facility: HOSPITAL | Age: 47
End: 2022-10-20

## 2022-10-20 VITALS
OXYGEN SATURATION: 97 % | DIASTOLIC BLOOD PRESSURE: 77 MMHG | TEMPERATURE: 98.3 F | BODY MASS INDEX: 56.25 KG/M2 | HEIGHT: 65 IN | RESPIRATION RATE: 18 BRPM | SYSTOLIC BLOOD PRESSURE: 128 MMHG | HEART RATE: 84 BPM

## 2022-10-20 LAB — QT INTERVAL: 404 MS

## 2022-10-20 PROCEDURE — 93005 ELECTROCARDIOGRAM TRACING: CPT

## 2022-10-20 PROCEDURE — 93010 ELECTROCARDIOGRAM REPORT: CPT | Performed by: INTERNAL MEDICINE

## 2022-10-20 RX ORDER — HYDROXYZINE HYDROCHLORIDE 25 MG/1
25 TABLET, FILM COATED ORAL EVERY MORNING
COMMUNITY

## 2022-10-20 RX ORDER — CHLORHEXIDINE GLUCONATE 500 MG/1
1 CLOTH TOPICAL TAKE AS DIRECTED
COMMUNITY
End: 2022-10-25 | Stop reason: HOSPADM

## 2022-10-20 RX ORDER — ASPIRIN 81 MG/1
81 TABLET ORAL DAILY
Status: ON HOLD | COMMUNITY
End: 2022-10-25 | Stop reason: SDUPTHER

## 2022-10-20 NOTE — DISCHARGE INSTRUCTIONS
Take only the following medications the morning of surgery:   CARVEDILOL, INHALER    Arrive to hospital on your day of surgery at  8:00 am.      Do not take Bariatric Vitamins, Folic Acid, Actigall (if applicable) or Lovenox Injections (if applicable) the morning of surgery.  If you have a history of blood clots or have a BMI greater than 50, Dr. Ribeiro may order Lovenox for after surgery. Do not take Lovenox blood thinner before surgery.      General Instructions:    Liquids only the day before surgery.  Drink one 20 ounce Gatorade G2 the evening before surgery.  Nothing red in color.   The morning of surgery have another 20 ounce Gatorade G2.  Again, nothing red in color.  Your drink must be completed 2 hours before your arrival time.   Patients who avoid smoking, chewing tobacco and alcohol for 4 weeks prior to surgery have a reduced risk of post-operative complications.  Quit smoking as many days before surgery as you can.  Do not smoke, use chewing tobacco or drink alcohol the day of surgery.   Bring any papers given to you in the doctor's office.  Wear clean comfortable clothes.  Do not wear contact lenses, false eyelashes or make-up.  Bring a case for your glasses.   Bring crutches or walker if applicable.  Remove all piercings.  Leave jewelry and any other valuables at home.  Remove fingernail polish, gel overlays or any artificial nails.  Hair extensions with metal clips must be removed prior to surgery.  The Pre-Admission Testing nurse will instruct you to bring medications if unable to obtain an accurate list in Pre-Admission Testing.    If you were given a blood bank ID arm band remember to bring it with you the day of surgery.    Preventing a Surgical Site Infection:  For 2 to 3 days before surgery, avoid shaving with a razor because the razor can irritate skin and make it easier to develop an infection.    Any areas of open skin can increase the risk of a post-operative wound infection by allowing  bacteria to enter and travel throughout the body.  Notify your surgeon if you have any skin wounds / rashes even if it is not near the expected surgical site.  The area will need assessed to determine if surgery should be delayed until it is healed.  2 days prior to surgery, take a shower using a fresh bar of anti-bacterial soap (such as Dial).  Use a clean washcloth and dry with a clean towel.    The day prior to surgery, take a shower using a fresh bar of anti-bacterial soap (such as Dial).  Use a clean washcloth and dry with a clean towel.  Sleep in a clean bed with clean clothing.  Do not allow pets to sleep with you.  The morning of surgery shower using a fresh bar of anti-bacterial soap (such as Dial).  Use a clean washcloth and dry with a clean towel.  Follow the Chlorhexidine instructions below.    CHLORHEXIDINE CLOTH INSTRUCTIONS  The morning of surgery follow these instructions using the Chlorhexidine cloths you've been given.  These steps reduce bacteria on the body.  Do not use the cloths near your eyes, ears mouth, genitalia or on open wounds.  Throw the cloths away after use but do not try to flush them down a toilet.    Open and remove one cloth at a time from the package.    Leave the cloth unfolded and begin the bathing.  Massage the skin with the cloths using gentle pressure to remove bacteria.  Do not scrub harshly.   Follow the steps below with one 2% CHG cloth per area (6 total cloths).  One cloth for neck, shoulders and chest.  One cloth for both arms, hands, fingers and underarms (do underarms last).  One cloth for the abdomen followed by groin.  One cloth for right leg and foot including between the toes.  One cloth for left leg and foot including between the toes.  The last cloth is to be used for the back of the neck, back and buttocks.    Allow the CHG to air dry 3 minutes on the skin which will give it time to work and decrease the chance of irritation.  The skin may feel sticky until it  is dry.  Do not rinse with water or any other liquid or you will lose the beneficial effects of the CHG.  If mild skin irritation occurs, do rinse the skin to remove the CHG.  Report this to the nurse at time of admission.  Do not apply lotions, creams, ointments, deodorants or perfumes after using the clothes. Dress in clean clothes before coming to the hospital.    Ask your surgeon if you will be receiving antibiotics prior to surgery.  Make sure you, your family, and all healthcare providers clean their hands with soap and water or an alcohol based hand  before caring for you or your wound.      Day of surgery:  Your arrival time is approximately two hours before your scheduled surgery time.  Upon arrival, a Pre-op nurse and Anesthesiologist will review your health history, obtain vital signs, and answer questions you may have.  A Pre-op nurse will start an IV and you may receive medication in preparation for surgery, including something to help you relax.  If applicable, we do ask that you have your C-PAP/BI-PAP machine available. It can be utilized the night of surgery.     Please be aware that surgery does come with discomfort.  We want to make every effort to control your discomfort so please discuss any uncontrolled symptoms with your nurse.   Your doctor will most likely have prescribed pain medications.      If you are going home after surgery you will receive individualized written care instructions before being discharged.  A responsible adult must drive you to and from the hospital on the day of your surgery and stay with you for 24 hours.  Discharge prescriptions can be filled by the hospital pharmacy during regular pharmacy hours.  If you are having surgery late in the day/evening your prescription may be e-prescribed to your pharmacy.  Please verify your pharmacy hours or chose a 24 hour pharmacy to avoid not having access to your prescription because your pharmacy has closed for the  day.    If you are staying overnight following surgery, you will be transported to your hospital room following the recovery period.  [facility] has all private rooms.    If you have any questions please call Pre-Admission Testing at [phone].  Deductibles and co-payments are collected on the day of service. Please be prepared to pay the required co-pay, deductible or deposit on the day of service as defined by your plan.    Call your surgeon immediately if you experience any of the following symptoms:  Sore Throat  Shortness of Breath or difficulty breathing  Cough  Chills  Body soreness or muscle pain  Headache  Fever  New loss of taste or smell  Do not arrive for your surgery ill.  Your procedure will need to be rescheduled to another time.  You will need to call your physician before the day of surgery to avoid any unnecessary exposure to hospital staff as well as other patients.

## 2022-10-21 RX ORDER — ACETAMINOPHEN 10 MG/ML
1000 INJECTION, SOLUTION INTRAVENOUS ONCE
Status: CANCELLED | OUTPATIENT
Start: 2022-10-24

## 2022-10-24 ENCOUNTER — ANESTHESIA EVENT (OUTPATIENT)
Dept: PERIOP | Facility: HOSPITAL | Age: 47
End: 2022-10-24

## 2022-10-24 ENCOUNTER — ANESTHESIA (OUTPATIENT)
Dept: PERIOP | Facility: HOSPITAL | Age: 47
End: 2022-10-24

## 2022-10-24 ENCOUNTER — HOSPITAL ENCOUNTER (INPATIENT)
Facility: HOSPITAL | Age: 47
LOS: 1 days | Discharge: HOME | End: 2022-10-25
Attending: SURGERY | Admitting: SURGERY

## 2022-10-24 DIAGNOSIS — E66.01 CLASS 3 SEVERE OBESITY DUE TO EXCESS CALORIES WITH SERIOUS COMORBIDITY AND BODY MASS INDEX (BMI) OF 50.0 TO 59.9 IN ADULT: ICD-10-CM

## 2022-10-24 DIAGNOSIS — K44.9 HIATAL HERNIA: ICD-10-CM

## 2022-10-24 LAB — GLUCOSE BLDC GLUCOMTR-MCNC: 124 MG/DL (ref 70–130)

## 2022-10-24 PROCEDURE — 25010000002 EPINEPHRINE 1 MG/ML SOLUTION 30 ML VIAL: Performed by: SURGERY

## 2022-10-24 PROCEDURE — 88307 TISSUE EXAM BY PATHOLOGIST: CPT | Performed by: SURGERY

## 2022-10-24 PROCEDURE — 25010000002 ROPIVACAINE PER 1 MG: Performed by: SURGERY

## 2022-10-24 PROCEDURE — 25010000002 FENTANYL CITRATE (PF) 100 MCG/2ML SOLUTION: Performed by: STUDENT IN AN ORGANIZED HEALTH CARE EDUCATION/TRAINING PROGRAM

## 2022-10-24 PROCEDURE — 43775 LAP SLEEVE GASTRECTOMY: CPT | Performed by: SURGERY

## 2022-10-24 PROCEDURE — 25010000002 METOCLOPRAMIDE PER 10 MG: Performed by: SURGERY

## 2022-10-24 PROCEDURE — 25010000002 ONDANSETRON PER 1 MG: Performed by: STUDENT IN AN ORGANIZED HEALTH CARE EDUCATION/TRAINING PROGRAM

## 2022-10-24 PROCEDURE — 25010000002 MAGNESIUM SULFATE PER 500 MG OF MAGNESIUM: Performed by: STUDENT IN AN ORGANIZED HEALTH CARE EDUCATION/TRAINING PROGRAM

## 2022-10-24 PROCEDURE — 25010000002 MIDAZOLAM PER 1 MG: Performed by: ANESTHESIOLOGY

## 2022-10-24 PROCEDURE — 25010000002 DEXAMETHASONE PER 1 MG: Performed by: STUDENT IN AN ORGANIZED HEALTH CARE EDUCATION/TRAINING PROGRAM

## 2022-10-24 PROCEDURE — 25010000002 HYDROMORPHONE PER 4 MG: Performed by: SURGERY

## 2022-10-24 PROCEDURE — 43281 LAP PARAESOPHAG HERN REPAIR: CPT | Performed by: SURGERY

## 2022-10-24 PROCEDURE — 43281 LAP PARAESOPHAG HERN REPAIR: CPT | Performed by: NURSE PRACTITIONER

## 2022-10-24 PROCEDURE — 0DB64Z3 EXCISION OF STOMACH, PERCUTANEOUS ENDOSCOPIC APPROACH, VERTICAL: ICD-10-PCS | Performed by: SURGERY

## 2022-10-24 PROCEDURE — 43775 LAP SLEEVE GASTRECTOMY: CPT | Performed by: NURSE PRACTITIONER

## 2022-10-24 PROCEDURE — 25010000002 CLONIDINE PER 1 MG: Performed by: SURGERY

## 2022-10-24 PROCEDURE — 0BQT4ZZ REPAIR DIAPHRAGM, PERCUTANEOUS ENDOSCOPIC APPROACH: ICD-10-PCS | Performed by: SURGERY

## 2022-10-24 PROCEDURE — 25010000002 PROPOFOL 10 MG/ML EMULSION: Performed by: STUDENT IN AN ORGANIZED HEALTH CARE EDUCATION/TRAINING PROGRAM

## 2022-10-24 PROCEDURE — 25010000002 CEFAZOLIN PER 500 MG: Performed by: SURGERY

## 2022-10-24 PROCEDURE — 82962 GLUCOSE BLOOD TEST: CPT

## 2022-10-24 PROCEDURE — 25010000002 FENTANYL CITRATE (PF) 50 MCG/ML SOLUTION: Performed by: STUDENT IN AN ORGANIZED HEALTH CARE EDUCATION/TRAINING PROGRAM

## 2022-10-24 DEVICE — IMPLANTABLE DEVICE
Type: IMPLANTABLE DEVICE | Site: ABDOMEN | Status: FUNCTIONAL
Brand: TITAN SGS STANDARD GASTRIC STAPLER

## 2022-10-24 DEVICE — SEALANT WND FIBRIN TISSEEL PREFIL/SYR/PRIMAFZ 4ML: Type: IMPLANTABLE DEVICE | Site: ABDOMEN | Status: FUNCTIONAL

## 2022-10-24 RX ORDER — SCOLOPAMINE TRANSDERMAL SYSTEM 1 MG/1
1 PATCH, EXTENDED RELEASE TRANSDERMAL CONTINUOUS
Status: DISCONTINUED | OUTPATIENT
Start: 2022-10-24 | End: 2022-10-25 | Stop reason: HOSPADM

## 2022-10-24 RX ORDER — ACETAMINOPHEN 160 MG/5ML
975 SOLUTION ORAL EVERY 6 HOURS
Status: DISCONTINUED | OUTPATIENT
Start: 2022-10-24 | End: 2022-10-25 | Stop reason: HOSPADM

## 2022-10-24 RX ORDER — FLUMAZENIL 0.1 MG/ML
0.2 INJECTION INTRAVENOUS AS NEEDED
Status: DISCONTINUED | OUTPATIENT
Start: 2022-10-24 | End: 2022-10-24 | Stop reason: HOSPADM

## 2022-10-24 RX ORDER — SODIUM CHLORIDE 9 MG/ML
INJECTION, SOLUTION INTRAVENOUS AS NEEDED
Status: DISCONTINUED | OUTPATIENT
Start: 2022-10-24 | End: 2022-10-24 | Stop reason: HOSPADM

## 2022-10-24 RX ORDER — CEFAZOLIN SODIUM IN 0.9 % NACL 3 G/100 ML
3 INTRAVENOUS SOLUTION, PIGGYBACK (ML) INTRAVENOUS
Status: COMPLETED | OUTPATIENT
Start: 2022-10-24 | End: 2022-10-24

## 2022-10-24 RX ORDER — SODIUM CHLORIDE, SODIUM LACTATE, POTASSIUM CHLORIDE, CALCIUM CHLORIDE 600; 310; 30; 20 MG/100ML; MG/100ML; MG/100ML; MG/100ML
150 INJECTION, SOLUTION INTRAVENOUS CONTINUOUS
Status: DISCONTINUED | OUTPATIENT
Start: 2022-10-24 | End: 2022-10-25 | Stop reason: HOSPADM

## 2022-10-24 RX ORDER — EPHEDRINE SULFATE 50 MG/ML
5 INJECTION, SOLUTION INTRAVENOUS ONCE AS NEEDED
Status: DISCONTINUED | OUTPATIENT
Start: 2022-10-24 | End: 2022-10-24 | Stop reason: HOSPADM

## 2022-10-24 RX ORDER — ONDANSETRON 4 MG/1
4 TABLET, FILM COATED ORAL EVERY 4 HOURS PRN
Status: DISCONTINUED | OUTPATIENT
Start: 2022-10-24 | End: 2022-10-25 | Stop reason: HOSPADM

## 2022-10-24 RX ORDER — ONDANSETRON 2 MG/ML
4 INJECTION INTRAMUSCULAR; INTRAVENOUS EVERY 4 HOURS PRN
Status: DISCONTINUED | OUTPATIENT
Start: 2022-10-24 | End: 2022-10-25 | Stop reason: HOSPADM

## 2022-10-24 RX ORDER — FAMOTIDINE 10 MG/ML
20 INJECTION, SOLUTION INTRAVENOUS EVERY 12 HOURS SCHEDULED
Status: DISCONTINUED | OUTPATIENT
Start: 2022-10-24 | End: 2022-10-25 | Stop reason: HOSPADM

## 2022-10-24 RX ORDER — PROMETHAZINE HYDROCHLORIDE 25 MG/1
25 SUPPOSITORY RECTAL ONCE
Status: DISCONTINUED | OUTPATIENT
Start: 2022-10-24 | End: 2022-10-24 | Stop reason: HOSPADM

## 2022-10-24 RX ORDER — PROCHLORPERAZINE EDISYLATE 5 MG/ML
10 INJECTION INTRAMUSCULAR; INTRAVENOUS EVERY 6 HOURS PRN
Status: DISCONTINUED | OUTPATIENT
Start: 2022-10-24 | End: 2022-10-25 | Stop reason: HOSPADM

## 2022-10-24 RX ORDER — DIPHENHYDRAMINE HYDROCHLORIDE 50 MG/ML
12.5 INJECTION INTRAMUSCULAR; INTRAVENOUS
Status: DISCONTINUED | OUTPATIENT
Start: 2022-10-24 | End: 2022-10-24 | Stop reason: HOSPADM

## 2022-10-24 RX ORDER — LORAZEPAM 1 MG/1
1 TABLET ORAL EVERY 12 HOURS PRN
Status: DISCONTINUED | OUTPATIENT
Start: 2022-10-24 | End: 2022-10-25 | Stop reason: HOSPADM

## 2022-10-24 RX ORDER — MIDAZOLAM HYDROCHLORIDE 1 MG/ML
1 INJECTION INTRAMUSCULAR; INTRAVENOUS
Status: COMPLETED | OUTPATIENT
Start: 2022-10-24 | End: 2022-10-24

## 2022-10-24 RX ORDER — DEXAMETHASONE SODIUM PHOSPHATE 4 MG/ML
INJECTION, SOLUTION INTRA-ARTICULAR; INTRALESIONAL; INTRAMUSCULAR; INTRAVENOUS; SOFT TISSUE AS NEEDED
Status: DISCONTINUED | OUTPATIENT
Start: 2022-10-24 | End: 2022-10-24 | Stop reason: SURG

## 2022-10-24 RX ORDER — SODIUM CHLORIDE 0.9 % (FLUSH) 0.9 %
3 SYRINGE (ML) INJECTION EVERY 12 HOURS SCHEDULED
Status: DISCONTINUED | OUTPATIENT
Start: 2022-10-24 | End: 2022-10-24 | Stop reason: HOSPADM

## 2022-10-24 RX ORDER — CYANOCOBALAMIN 1000 UG/ML
1000 INJECTION, SOLUTION INTRAMUSCULAR; SUBCUTANEOUS ONCE
Status: COMPLETED | OUTPATIENT
Start: 2022-10-25 | End: 2022-10-25

## 2022-10-24 RX ORDER — ONDANSETRON 4 MG/1
4 TABLET, ORALLY DISINTEGRATING ORAL EVERY 4 HOURS PRN
Status: DISCONTINUED | OUTPATIENT
Start: 2022-10-24 | End: 2022-10-25 | Stop reason: HOSPADM

## 2022-10-24 RX ORDER — PROMETHAZINE HYDROCHLORIDE 12.5 MG/1
12.5 SUPPOSITORY RECTAL EVERY 4 HOURS PRN
Status: DISCONTINUED | OUTPATIENT
Start: 2022-10-24 | End: 2022-10-25 | Stop reason: HOSPADM

## 2022-10-24 RX ORDER — PROMETHAZINE HYDROCHLORIDE 12.5 MG/1
12.5 TABLET ORAL EVERY 4 HOURS PRN
Status: DISCONTINUED | OUTPATIENT
Start: 2022-10-24 | End: 2022-10-25 | Stop reason: HOSPADM

## 2022-10-24 RX ORDER — ONDANSETRON 2 MG/ML
INJECTION INTRAMUSCULAR; INTRAVENOUS AS NEEDED
Status: DISCONTINUED | OUTPATIENT
Start: 2022-10-24 | End: 2022-10-24 | Stop reason: SURG

## 2022-10-24 RX ORDER — HYDRALAZINE HYDROCHLORIDE 20 MG/ML
5 INJECTION INTRAMUSCULAR; INTRAVENOUS
Status: DISCONTINUED | OUTPATIENT
Start: 2022-10-24 | End: 2022-10-24 | Stop reason: HOSPADM

## 2022-10-24 RX ORDER — ONDANSETRON 2 MG/ML
4 INJECTION INTRAMUSCULAR; INTRAVENOUS ONCE AS NEEDED
Status: COMPLETED | OUTPATIENT
Start: 2022-10-24 | End: 2022-10-24

## 2022-10-24 RX ORDER — LIDOCAINE HYDROCHLORIDE 20 MG/ML
INJECTION, SOLUTION INFILTRATION; PERINEURAL AS NEEDED
Status: DISCONTINUED | OUTPATIENT
Start: 2022-10-24 | End: 2022-10-24 | Stop reason: SURG

## 2022-10-24 RX ORDER — DIPHENHYDRAMINE HCL 25 MG
25 CAPSULE ORAL
Status: DISCONTINUED | OUTPATIENT
Start: 2022-10-24 | End: 2022-10-24 | Stop reason: HOSPADM

## 2022-10-24 RX ORDER — CARVEDILOL 12.5 MG/1
12.5 TABLET ORAL 2 TIMES DAILY WITH MEALS
Status: DISCONTINUED | OUTPATIENT
Start: 2022-10-24 | End: 2022-10-25 | Stop reason: HOSPADM

## 2022-10-24 RX ORDER — PANTOPRAZOLE SODIUM 40 MG/10ML
40 INJECTION, POWDER, LYOPHILIZED, FOR SOLUTION INTRAVENOUS ONCE
Status: COMPLETED | OUTPATIENT
Start: 2022-10-24 | End: 2022-10-24

## 2022-10-24 RX ORDER — CHLORHEXIDINE GLUCONATE 0.12 MG/ML
15 RINSE ORAL SEE ADMIN INSTRUCTIONS
Status: COMPLETED | OUTPATIENT
Start: 2022-10-24 | End: 2022-10-24

## 2022-10-24 RX ORDER — ACETAMINOPHEN 500 MG
1000 TABLET ORAL EVERY 6 HOURS
Status: DISCONTINUED | OUTPATIENT
Start: 2022-10-24 | End: 2022-10-25 | Stop reason: HOSPADM

## 2022-10-24 RX ORDER — METOCLOPRAMIDE HYDROCHLORIDE 5 MG/ML
10 INJECTION INTRAMUSCULAR; INTRAVENOUS ONCE
Status: COMPLETED | OUTPATIENT
Start: 2022-10-24 | End: 2022-10-24

## 2022-10-24 RX ORDER — SODIUM CHLORIDE 0.9 % (FLUSH) 0.9 %
3-10 SYRINGE (ML) INJECTION AS NEEDED
Status: DISCONTINUED | OUTPATIENT
Start: 2022-10-24 | End: 2022-10-24 | Stop reason: HOSPADM

## 2022-10-24 RX ORDER — LOSARTAN POTASSIUM 100 MG/1
100 TABLET ORAL EVERY EVENING
Status: DISCONTINUED | OUTPATIENT
Start: 2022-10-24 | End: 2022-10-25 | Stop reason: HOSPADM

## 2022-10-24 RX ORDER — SODIUM CHLORIDE, SODIUM LACTATE, POTASSIUM CHLORIDE, CALCIUM CHLORIDE 600; 310; 30; 20 MG/100ML; MG/100ML; MG/100ML; MG/100ML
100 INJECTION, SOLUTION INTRAVENOUS CONTINUOUS
Status: DISCONTINUED | OUTPATIENT
Start: 2022-10-24 | End: 2022-10-24

## 2022-10-24 RX ORDER — GABAPENTIN 250 MG/5ML
300 SOLUTION ORAL ONCE
Status: COMPLETED | OUTPATIENT
Start: 2022-10-24 | End: 2022-10-24

## 2022-10-24 RX ORDER — MAGNESIUM HYDROXIDE 1200 MG/15ML
LIQUID ORAL AS NEEDED
Status: DISCONTINUED | OUTPATIENT
Start: 2022-10-24 | End: 2022-10-24 | Stop reason: HOSPADM

## 2022-10-24 RX ORDER — METOCLOPRAMIDE HYDROCHLORIDE 5 MG/ML
10 INJECTION INTRAMUSCULAR; INTRAVENOUS EVERY 6 HOURS
Status: DISCONTINUED | OUTPATIENT
Start: 2022-10-24 | End: 2022-10-25 | Stop reason: HOSPADM

## 2022-10-24 RX ORDER — GABAPENTIN 250 MG/5ML
300 SOLUTION ORAL EVERY 8 HOURS
Status: DISCONTINUED | OUTPATIENT
Start: 2022-10-24 | End: 2022-10-25 | Stop reason: HOSPADM

## 2022-10-24 RX ORDER — LABETALOL HYDROCHLORIDE 5 MG/ML
5 INJECTION, SOLUTION INTRAVENOUS
Status: DISCONTINUED | OUTPATIENT
Start: 2022-10-24 | End: 2022-10-24 | Stop reason: HOSPADM

## 2022-10-24 RX ORDER — HYDROMORPHONE HYDROCHLORIDE 2 MG/1
2 TABLET ORAL EVERY 4 HOURS PRN
Status: DISCONTINUED | OUTPATIENT
Start: 2022-10-24 | End: 2022-10-25 | Stop reason: HOSPADM

## 2022-10-24 RX ORDER — NALOXONE HCL 0.4 MG/ML
0.2 VIAL (ML) INJECTION AS NEEDED
Status: DISCONTINUED | OUTPATIENT
Start: 2022-10-24 | End: 2022-10-24 | Stop reason: HOSPADM

## 2022-10-24 RX ORDER — ALBUTEROL SULFATE 2.5 MG/3ML
2.5 SOLUTION RESPIRATORY (INHALATION) EVERY 4 HOURS PRN
Status: DISCONTINUED | OUTPATIENT
Start: 2022-10-24 | End: 2022-10-25 | Stop reason: HOSPADM

## 2022-10-24 RX ORDER — HYDROMORPHONE HYDROCHLORIDE 1 MG/ML
0.5 INJECTION, SOLUTION INTRAMUSCULAR; INTRAVENOUS; SUBCUTANEOUS
Status: DISCONTINUED | OUTPATIENT
Start: 2022-10-24 | End: 2022-10-25 | Stop reason: HOSPADM

## 2022-10-24 RX ORDER — PROMETHAZINE HYDROCHLORIDE 25 MG/1
25 SUPPOSITORY RECTAL ONCE AS NEEDED
Status: DISCONTINUED | OUTPATIENT
Start: 2022-10-24 | End: 2022-10-24 | Stop reason: HOSPADM

## 2022-10-24 RX ORDER — SODIUM CHLORIDE, SODIUM LACTATE, POTASSIUM CHLORIDE, CALCIUM CHLORIDE 600; 310; 30; 20 MG/100ML; MG/100ML; MG/100ML; MG/100ML
9 INJECTION, SOLUTION INTRAVENOUS CONTINUOUS
Status: DISCONTINUED | OUTPATIENT
Start: 2022-10-24 | End: 2022-10-24

## 2022-10-24 RX ORDER — PROMETHAZINE HYDROCHLORIDE 25 MG/1
25 TABLET ORAL ONCE AS NEEDED
Status: DISCONTINUED | OUTPATIENT
Start: 2022-10-24 | End: 2022-10-24 | Stop reason: HOSPADM

## 2022-10-24 RX ORDER — ACETAMINOPHEN 10 MG/ML
INJECTION, SOLUTION INTRAVENOUS AS NEEDED
Status: DISCONTINUED | OUTPATIENT
Start: 2022-10-24 | End: 2022-10-24 | Stop reason: SURG

## 2022-10-24 RX ORDER — GABAPENTIN 300 MG/1
300 CAPSULE ORAL EVERY 8 HOURS
Status: DISCONTINUED | OUTPATIENT
Start: 2022-10-24 | End: 2022-10-25 | Stop reason: HOSPADM

## 2022-10-24 RX ORDER — ROCURONIUM BROMIDE 10 MG/ML
INJECTION, SOLUTION INTRAVENOUS AS NEEDED
Status: DISCONTINUED | OUTPATIENT
Start: 2022-10-24 | End: 2022-10-24 | Stop reason: SURG

## 2022-10-24 RX ORDER — PROMETHAZINE HYDROCHLORIDE 25 MG/1
12.5 TABLET ORAL ONCE
Status: DISCONTINUED | OUTPATIENT
Start: 2022-10-24 | End: 2022-10-24 | Stop reason: HOSPADM

## 2022-10-24 RX ORDER — FENTANYL CITRATE 50 UG/ML
50 INJECTION, SOLUTION INTRAMUSCULAR; INTRAVENOUS
Status: DISCONTINUED | OUTPATIENT
Start: 2022-10-24 | End: 2022-10-24 | Stop reason: HOSPADM

## 2022-10-24 RX ORDER — ENOXAPARIN SODIUM 100 MG/ML
40 INJECTION SUBCUTANEOUS ONCE
Status: COMPLETED | OUTPATIENT
Start: 2022-10-25 | End: 2022-10-25

## 2022-10-24 RX ORDER — HYDRALAZINE HYDROCHLORIDE 20 MG/ML
10 INJECTION INTRAMUSCULAR; INTRAVENOUS
Status: DISCONTINUED | OUTPATIENT
Start: 2022-10-24 | End: 2022-10-25 | Stop reason: HOSPADM

## 2022-10-24 RX ORDER — MAGNESIUM SULFATE HEPTAHYDRATE 500 MG/ML
INJECTION, SOLUTION INTRAMUSCULAR; INTRAVENOUS AS NEEDED
Status: DISCONTINUED | OUTPATIENT
Start: 2022-10-24 | End: 2022-10-24 | Stop reason: SURG

## 2022-10-24 RX ORDER — KETAMINE HCL IN NACL, ISO-OSM 100MG/10ML
SYRINGE (ML) INJECTION AS NEEDED
Status: DISCONTINUED | OUTPATIENT
Start: 2022-10-24 | End: 2022-10-24 | Stop reason: SURG

## 2022-10-24 RX ORDER — FENTANYL CITRATE 50 UG/ML
INJECTION, SOLUTION INTRAMUSCULAR; INTRAVENOUS AS NEEDED
Status: DISCONTINUED | OUTPATIENT
Start: 2022-10-24 | End: 2022-10-24 | Stop reason: SURG

## 2022-10-24 RX ORDER — DIPHENHYDRAMINE HYDROCHLORIDE 50 MG/ML
25 INJECTION INTRAMUSCULAR; INTRAVENOUS EVERY 4 HOURS PRN
Status: DISCONTINUED | OUTPATIENT
Start: 2022-10-24 | End: 2022-10-25 | Stop reason: HOSPADM

## 2022-10-24 RX ORDER — PROPOFOL 10 MG/ML
VIAL (ML) INTRAVENOUS AS NEEDED
Status: DISCONTINUED | OUTPATIENT
Start: 2022-10-24 | End: 2022-10-24 | Stop reason: SURG

## 2022-10-24 RX ORDER — NALOXONE HCL 0.4 MG/ML
0.1 VIAL (ML) INJECTION
Status: DISCONTINUED | OUTPATIENT
Start: 2022-10-24 | End: 2022-10-25 | Stop reason: HOSPADM

## 2022-10-24 RX ORDER — MIRTAZAPINE 15 MG/1
15 TABLET, ORALLY DISINTEGRATING ORAL NIGHTLY
Status: DISCONTINUED | OUTPATIENT
Start: 2022-10-24 | End: 2022-10-25 | Stop reason: HOSPADM

## 2022-10-24 RX ADMIN — METOCLOPRAMIDE 10 MG: 5 INJECTION, SOLUTION INTRAMUSCULAR; INTRAVENOUS at 23:34

## 2022-10-24 RX ADMIN — SUGAMMADEX 200 MG: 100 INJECTION, SOLUTION INTRAVENOUS at 14:40

## 2022-10-24 RX ADMIN — METOCLOPRAMIDE 10 MG: 5 INJECTION, SOLUTION INTRAMUSCULAR; INTRAVENOUS at 09:39

## 2022-10-24 RX ADMIN — FAMOTIDINE 20 MG: 10 INJECTION INTRAVENOUS at 21:01

## 2022-10-24 RX ADMIN — SODIUM CHLORIDE, POTASSIUM CHLORIDE, SODIUM LACTATE AND CALCIUM CHLORIDE 500 ML: 600; 310; 30; 20 INJECTION, SOLUTION INTRAVENOUS at 09:19

## 2022-10-24 RX ADMIN — LIDOCAINE HYDROCHLORIDE 100 MG: 20 INJECTION, SOLUTION INFILTRATION; PERINEURAL at 13:37

## 2022-10-24 RX ADMIN — FENTANYL CITRATE 50 MCG: 50 INJECTION, SOLUTION INTRAMUSCULAR; INTRAVENOUS at 13:52

## 2022-10-24 RX ADMIN — HYDROMORPHONE HYDROCHLORIDE 0.5 MG: 1 INJECTION, SOLUTION INTRAMUSCULAR; INTRAVENOUS; SUBCUTANEOUS at 18:58

## 2022-10-24 RX ADMIN — FENTANYL CITRATE 50 MCG: 50 INJECTION, SOLUTION INTRAMUSCULAR; INTRAVENOUS at 14:24

## 2022-10-24 RX ADMIN — MAGNESIUM SULFATE HEPTAHYDRATE 2 G: 500 INJECTION, SOLUTION INTRAMUSCULAR; INTRAVENOUS at 13:57

## 2022-10-24 RX ADMIN — MIDAZOLAM 1 MG: 1 INJECTION, SOLUTION INTRAMUSCULAR; INTRAVENOUS at 11:51

## 2022-10-24 RX ADMIN — HYDROMORPHONE HYDROCHLORIDE 2 MG: 2 TABLET ORAL at 23:35

## 2022-10-24 RX ADMIN — FOLIC ACID 250 ML/HR: 5 INJECTION, SOLUTION INTRAMUSCULAR; INTRAVENOUS; SUBCUTANEOUS at 23:34

## 2022-10-24 RX ADMIN — ROCURONIUM BROMIDE 50 MG: 50 INJECTION INTRAVENOUS at 13:38

## 2022-10-24 RX ADMIN — ACETAMINOPHEN 975 MG: 325 SUSPENSION ORAL at 23:33

## 2022-10-24 RX ADMIN — SCOPALAMINE 1 PATCH: 1 PATCH, EXTENDED RELEASE TRANSDERMAL at 09:39

## 2022-10-24 RX ADMIN — PROPOFOL 100 MCG/KG/MIN: 10 INJECTION, EMULSION INTRAVENOUS at 13:45

## 2022-10-24 RX ADMIN — ONDANSETRON 4 MG: 2 INJECTION INTRAMUSCULAR; INTRAVENOUS at 17:18

## 2022-10-24 RX ADMIN — DEXAMETHASONE SODIUM PHOSPHATE 8 MG: 4 INJECTION, SOLUTION INTRAMUSCULAR; INTRAVENOUS at 13:46

## 2022-10-24 RX ADMIN — MIDAZOLAM 1 MG: 1 INJECTION, SOLUTION INTRAMUSCULAR; INTRAVENOUS at 09:51

## 2022-10-24 RX ADMIN — Medication 20 MG: at 13:45

## 2022-10-24 RX ADMIN — CHLORHEXIDINE GLUCONATE 15 ML: 1.2 SOLUTION ORAL at 09:39

## 2022-10-24 RX ADMIN — FENTANYL CITRATE 50 MCG: 50 INJECTION INTRAMUSCULAR; INTRAVENOUS at 15:01

## 2022-10-24 RX ADMIN — FENTANYL CITRATE 50 MCG: 50 INJECTION INTRAMUSCULAR; INTRAVENOUS at 15:10

## 2022-10-24 RX ADMIN — GABAPENTIN 300 MG: 250 SOLUTION ORAL at 09:38

## 2022-10-24 RX ADMIN — SODIUM CHLORIDE, POTASSIUM CHLORIDE, SODIUM LACTATE AND CALCIUM CHLORIDE 100 ML/HR: 600; 310; 30; 20 INJECTION, SOLUTION INTRAVENOUS at 15:12

## 2022-10-24 RX ADMIN — ACETAMINOPHEN 1000 MG: 10 INJECTION, SOLUTION INTRAVENOUS at 14:27

## 2022-10-24 RX ADMIN — LOSARTAN POTASSIUM 100 MG: 100 TABLET, FILM COATED ORAL at 22:33

## 2022-10-24 RX ADMIN — PANTOPRAZOLE SODIUM 40 MG: 40 INJECTION, POWDER, FOR SOLUTION INTRAVENOUS at 09:38

## 2022-10-24 RX ADMIN — ACETAMINOPHEN 975 MG: 325 SUSPENSION ORAL at 18:57

## 2022-10-24 RX ADMIN — PROPOFOL 200 MG: 10 INJECTION, EMULSION INTRAVENOUS at 13:37

## 2022-10-24 RX ADMIN — SODIUM CHLORIDE, POTASSIUM CHLORIDE, SODIUM LACTATE AND CALCIUM CHLORIDE: 600; 310; 30; 20 INJECTION, SOLUTION INTRAVENOUS at 13:28

## 2022-10-24 RX ADMIN — METOCLOPRAMIDE 10 MG: 5 INJECTION, SOLUTION INTRAMUSCULAR; INTRAVENOUS at 18:56

## 2022-10-24 RX ADMIN — CARVEDILOL 12.5 MG: 12.5 TABLET, FILM COATED ORAL at 22:33

## 2022-10-24 RX ADMIN — CEFAZOLIN 3 G: 10 INJECTION, POWDER, FOR SOLUTION INTRAVENOUS at 13:25

## 2022-10-24 RX ADMIN — ONDANSETRON 4 MG: 2 INJECTION INTRAMUSCULAR; INTRAVENOUS at 13:57

## 2022-10-24 RX ADMIN — MIRTAZAPINE 15 MG: 15 TABLET, ORALLY DISINTEGRATING ORAL at 21:01

## 2022-10-24 NOTE — ANESTHESIA PREPROCEDURE EVALUATION
Anesthesia Evaluation     history of anesthetic complications: PONV  NPO Solid Status: > 8 hours  NPO Liquid Status: > 2 hours           Airway   Mallampati: II  no difficulty expected  Dental    (+) poor dentition    Pulmonary - normal exam   (+) a smoker Former, COPD, asthma,sleep apnea on CPAP,     PE comment: nonlabored  Cardiovascular - normal exam  Exercise tolerance: good (4-7 METS)    Rhythm: regular  Rate: normal    (+) hypertension, dysrhythmias Tachycardia, DVT (R UE 5-6yrs ago),   (-) valvular problems/murmurs, past MI, CAD, angina      Neuro/Psych  (+) dizziness/light headedness, psychiatric history Anxiety and Depression,    (-) seizures, TIA, CVA    ROS Comment: H/o Bell's palsy ~15yrs ago  GI/Hepatic/Renal/Endo    (+) morbid obesity, hiatal hernia, GERD,  liver disease fatty liver disease, diabetes mellitus type 2,   (-) no renal disease, no thyroid disorder    Musculoskeletal     (+) arthralgias,       ROS comment: Spinal stenosis  Abdominal    Substance History      OB/GYN      Comment: PCOS      Other   arthritis, blood dyscrasia anemia,                     Anesthesia Plan    ASA 3     general   total IV anesthesia  intravenous induction     Anesthetic plan, risks, benefits, and alternatives have been provided, discussed and informed consent has been obtained with: patient.        CODE STATUS:

## 2022-10-24 NOTE — ANESTHESIA POSTPROCEDURE EVALUATION
Patient: Kimberley Andrade    Procedure Summary     Date: 10/24/22 Room / Location:  [facility]  /  [facility]    Anesthesia Start: 1328 Anesthesia Stop: 1454    Procedure: GASTRIC SLEEVE LAPAROSCOPIC With  PARAESPHAGEAL Hernia Repair, LYSIS OF ADHESIONS (Abdomen) Diagnosis:       Class 3 severe obesity due to excess calories with serious comorbidity and body mass index (BMI) of 50.0 to 59.9 in adult (HCC)      Hiatal hernia      (Class 3 severe obesity due to excess calories with serious comorbidity and body mass index (BMI) of 50.0 to 59.9 in adult (HCC) [E66.01, Z68.43])      (Hiatal hernia [K44.9])    Surgeons: Tai Ribeiro Jr., MD Provider: Juan Holland MD    Anesthesia Type: general ASA Status: 3          Anesthesia Type: general    Vitals  Vitals Value Taken Time   /92 10/24/22 1615   Temp 37.1 °C (98.7 °F) 10/24/22 1450   Pulse 78 10/24/22 1619   Resp 16 10/24/22 1545   SpO2 98 % 10/24/22 1619   Vitals shown include unvalidated device data.        Post Anesthesia Care and Evaluation    Patient location during evaluation: bedside  Patient participation: complete - patient participated  Level of consciousness: awake  Pain management: adequate    Airway patency: patent  Anesthetic complications: No anesthetic complications    Cardiovascular status: acceptable  Respiratory status: acceptable  Hydration status: acceptable    Comments: */88   Pulse 76   Temp 37.1 °C (98.7 °F) (Oral)   Resp 16   Wt (!) 153 kg (336 lb 10.3 oz)   SpO2 93%   BMI 56.02 kg/m²

## 2022-10-24 NOTE — ANESTHESIA PROCEDURE NOTES
Airway  Urgency: elective    Airway not difficult    General Information and Staff    Patient location during procedure: OR  Anesthesiologist: Juan Holland MD  CRNA/CAA: Kelvin Interiano CRNA    Indications and Patient Condition  Indications for airway management: airway protection    Preoxygenated: yes  MILS not maintained throughout  Mask difficulty assessment: 1 - vent by mask    Final Airway Details  Final airway type: endotracheal airway      Successful airway: ETT  Cuffed: yes   Successful intubation technique: direct laryngoscopy  Endotracheal tube insertion site: oral  Blade: Shiraz  Blade size: 3  ETT size (mm): 7.5  Cormack-Lehane Classification: grade IIa - partial view of glottis  Placement verified by: capnometry   Cuff volume (mL): 8  Measured from: lips  ETT/EBT  to lips (cm): 22  Number of attempts at approach: 1  Assessment: lips, teeth, and gum same as pre-op and atraumatic intubation

## 2022-10-25 ENCOUNTER — READMISSION MANAGEMENT (OUTPATIENT)
Dept: CALL CENTER | Facility: HOSPITAL | Age: 47
End: 2022-10-25

## 2022-10-25 VITALS
RESPIRATION RATE: 16 BRPM | TEMPERATURE: 97 F | WEIGHT: 293 LBS | DIASTOLIC BLOOD PRESSURE: 88 MMHG | HEART RATE: 88 BPM | SYSTOLIC BLOOD PRESSURE: 148 MMHG | BODY MASS INDEX: 56.42 KG/M2 | OXYGEN SATURATION: 96 %

## 2022-10-25 LAB
ALBUMIN SERPL-MCNC: 3.8 G/DL (ref 3.5–5.2)
ALBUMIN/GLOB SERPL: 1.4 G/DL
ALP SERPL-CCNC: 109 U/L (ref 39–117)
ALT SERPL W P-5'-P-CCNC: 81 U/L (ref 1–33)
ANION GAP SERPL CALCULATED.3IONS-SCNC: 10.6 MMOL/L (ref 5–15)
AST SERPL-CCNC: 48 U/L (ref 1–32)
BASOPHILS # BLD AUTO: 0.01 10*3/MM3 (ref 0–0.2)
BASOPHILS NFR BLD AUTO: 0.1 % (ref 0–1.5)
BILIRUB SERPL-MCNC: 0.6 MG/DL (ref 0–1.2)
BUN SERPL-MCNC: 6 MG/DL (ref 6–20)
BUN/CREAT SERPL: 10.5 (ref 7–25)
CALCIUM SPEC-SCNC: 9.2 MG/DL (ref 8.6–10.5)
CHLORIDE SERPL-SCNC: 105 MMOL/L (ref 98–107)
CO2 SERPL-SCNC: 21.4 MMOL/L (ref 22–29)
CREAT SERPL-MCNC: 0.57 MG/DL (ref 0.57–1)
DEPRECATED RDW RBC AUTO: 40.5 FL (ref 37–54)
EGFRCR SERPLBLD CKD-EPI 2021: 113 ML/MIN/1.73
EOSINOPHIL # BLD AUTO: 0.01 10*3/MM3 (ref 0–0.4)
EOSINOPHIL NFR BLD AUTO: 0.1 % (ref 0.3–6.2)
ERYTHROCYTE [DISTWIDTH] IN BLOOD BY AUTOMATED COUNT: 13.5 % (ref 12.3–15.4)
GLOBULIN UR ELPH-MCNC: 2.8 GM/DL
GLUCOSE SERPL-MCNC: 128 MG/DL (ref 65–99)
HCT VFR BLD AUTO: 37.6 % (ref 34–46.6)
HGB BLD-MCNC: 13 G/DL (ref 12–15.9)
IMM GRANULOCYTES # BLD AUTO: 0.09 10*3/MM3 (ref 0–0.05)
IMM GRANULOCYTES NFR BLD AUTO: 0.7 % (ref 0–0.5)
LYMPHOCYTES # BLD AUTO: 1.32 10*3/MM3 (ref 0.7–3.1)
LYMPHOCYTES NFR BLD AUTO: 10.5 % (ref 19.6–45.3)
MAGNESIUM SERPL-MCNC: 2 MG/DL (ref 1.6–2.6)
MCH RBC QN AUTO: 28.5 PG (ref 26.6–33)
MCHC RBC AUTO-ENTMCNC: 34.6 G/DL (ref 31.5–35.7)
MCV RBC AUTO: 82.5 FL (ref 79–97)
MONOCYTES # BLD AUTO: 0.5 10*3/MM3 (ref 0.1–0.9)
MONOCYTES NFR BLD AUTO: 4 % (ref 5–12)
NEUTROPHILS NFR BLD AUTO: 10.65 10*3/MM3 (ref 1.7–7)
NEUTROPHILS NFR BLD AUTO: 84.6 % (ref 42.7–76)
NRBC BLD AUTO-RTO: 0 /100 WBC (ref 0–0.2)
PHOSPHATE SERPL-MCNC: 2.3 MG/DL (ref 2.5–4.5)
PLATELET # BLD AUTO: 232 10*3/MM3 (ref 140–450)
PMV BLD AUTO: 9.9 FL (ref 6–12)
POTASSIUM SERPL-SCNC: 4 MMOL/L (ref 3.5–5.2)
PROT SERPL-MCNC: 6.6 G/DL (ref 6–8.5)
RBC # BLD AUTO: 4.56 10*6/MM3 (ref 3.77–5.28)
SODIUM SERPL-SCNC: 137 MMOL/L (ref 136–145)
WBC NRBC COR # BLD: 12.58 10*3/MM3 (ref 3.4–10.8)

## 2022-10-25 PROCEDURE — 85025 COMPLETE CBC W/AUTO DIFF WBC: CPT | Performed by: SURGERY

## 2022-10-25 PROCEDURE — 84100 ASSAY OF PHOSPHORUS: CPT | Performed by: SURGERY

## 2022-10-25 PROCEDURE — 25010000002 THIAMINE PER 100 MG: Performed by: SURGERY

## 2022-10-25 PROCEDURE — 25010000002 METOCLOPRAMIDE PER 10 MG: Performed by: SURGERY

## 2022-10-25 PROCEDURE — 80053 COMPREHEN METABOLIC PANEL: CPT | Performed by: SURGERY

## 2022-10-25 PROCEDURE — 25010000002 CYANOCOBALAMIN PER 1000 MCG: Performed by: SURGERY

## 2022-10-25 PROCEDURE — 25010000002 ENOXAPARIN PER 10 MG: Performed by: SURGERY

## 2022-10-25 PROCEDURE — 83735 ASSAY OF MAGNESIUM: CPT | Performed by: SURGERY

## 2022-10-25 RX ORDER — ASPIRIN 81 MG/1
81 TABLET ORAL DAILY
Start: 2022-10-25

## 2022-10-25 RX ORDER — ONDANSETRON 4 MG/1
4 TABLET, ORALLY DISINTEGRATING ORAL EVERY 4 HOURS PRN
Qty: 20 TABLET | Refills: 0 | Status: SHIPPED | OUTPATIENT
Start: 2022-10-25 | End: 2023-02-21

## 2022-10-25 RX ORDER — FUROSEMIDE 20 MG/1
20 TABLET ORAL DAILY
Start: 2022-10-25

## 2022-10-25 RX ORDER — HYDROMORPHONE HYDROCHLORIDE 2 MG/1
2 TABLET ORAL EVERY 4 HOURS PRN
Qty: 18 TABLET | Refills: 0 | Status: SHIPPED | OUTPATIENT
Start: 2022-10-25 | End: 2022-11-03

## 2022-10-25 RX ADMIN — SODIUM CHLORIDE, POTASSIUM CHLORIDE, SODIUM LACTATE AND CALCIUM CHLORIDE 150 ML/HR: 600; 310; 30; 20 INJECTION, SOLUTION INTRAVENOUS at 03:39

## 2022-10-25 RX ADMIN — CARVEDILOL 12.5 MG: 12.5 TABLET, FILM COATED ORAL at 08:55

## 2022-10-25 RX ADMIN — CYANOCOBALAMIN 1000 MCG: 1000 INJECTION, SOLUTION INTRAMUSCULAR; SUBCUTANEOUS at 08:55

## 2022-10-25 RX ADMIN — ENOXAPARIN SODIUM 40 MG: 40 INJECTION SUBCUTANEOUS at 10:20

## 2022-10-25 RX ADMIN — FAMOTIDINE 20 MG: 10 INJECTION INTRAVENOUS at 08:55

## 2022-10-25 RX ADMIN — HYDROMORPHONE HYDROCHLORIDE 2 MG: 2 TABLET ORAL at 06:11

## 2022-10-25 RX ADMIN — ACETAMINOPHEN 975 MG: 325 SUSPENSION ORAL at 06:11

## 2022-10-25 RX ADMIN — METOCLOPRAMIDE 10 MG: 5 INJECTION, SOLUTION INTRAMUSCULAR; INTRAVENOUS at 06:11

## 2022-10-26 NOTE — OUTREACH NOTE
Prep Survey    Flowsheet Row Responses   [facility] patient discharged from? [place]   Is LACE score < 7 ? No   Emergency Room discharge w/ pulse ox? No   Eligibility Readm Mgmt   Discharge diagnosis Morbid obesity with co-morbidities   Does the patient have one of the following disease processes/diagnoses(primary or secondary)? General Surgery   Does the patient have Home health ordered? No   Is there a DME ordered? Yes   What DME was ordered? [facility] per [facility]    Prep survey completed? Yes          NORMA ARMAS - Registered Nurse

## 2022-10-27 ENCOUNTER — OFFICE VISIT (OUTPATIENT)
Dept: BARIATRICS/WEIGHT MGMT | Facility: CLINIC | Age: 47
End: 2022-10-27

## 2022-10-27 VITALS
SYSTOLIC BLOOD PRESSURE: 139 MMHG | HEIGHT: 66 IN | HEART RATE: 90 BPM | DIASTOLIC BLOOD PRESSURE: 83 MMHG | TEMPERATURE: 97.7 F | RESPIRATION RATE: 18 BRPM | WEIGHT: 293 LBS | BODY MASS INDEX: 47.09 KG/M2

## 2022-10-27 DIAGNOSIS — E66.01 MORBID OBESITY WITH BMI OF 50.0-59.9, ADULT: Primary | ICD-10-CM

## 2022-10-27 DIAGNOSIS — Z71.3 DIETARY COUNSELING: ICD-10-CM

## 2022-10-27 DIAGNOSIS — Z98.84 S/P LAPAROSCOPIC SLEEVE GASTRECTOMY: ICD-10-CM

## 2022-10-27 PROBLEM — E66.813 CLASS 3 SEVERE OBESITY DUE TO EXCESS CALORIES WITH SERIOUS COMORBIDITY AND BODY MASS INDEX (BMI) OF 50.0 TO 59.9 IN ADULT: Status: RESOLVED | Noted: 2022-10-11 | Resolved: 2022-10-27

## 2022-10-27 LAB
LAB AP CASE REPORT: NORMAL
PATH REPORT.FINAL DX SPEC: NORMAL
PATH REPORT.GROSS SPEC: NORMAL

## 2022-10-27 PROCEDURE — 99024 POSTOP FOLLOW-UP VISIT: CPT | Performed by: NURSE PRACTITIONER

## 2022-10-27 RX ORDER — ERGOCALCIFEROL 1.25 MG/1
CAPSULE ORAL
COMMUNITY
Start: 2022-10-21 | End: 2022-10-27

## 2022-10-27 NOTE — PROGRESS NOTES
[facility] BARIATRIC SURGERY  [address] [facility] [address]  [facility] [address]  [phone]  [address] [id number] [phone]  Dept: [phone]  10/27/2022      Kimberley Andrade.  [id number]  [id number]  1975  female      Chief Complaint   Patient presents with   • Post-op     1 week post op sleeve       BH Post-Op Bariatric Surgery:   Kimberley Anrdade is status post laparopscopic Laparoscopic Sleeve/PHR procedure, performed on 10/24/22.     HPI:   Today's weight is (!) 152 kg (335 lb) pounds, today's BMI is Body mass index is 54.88 kg/m²., has a  loss of 3 pounds since the last visit and weight loss since surgery is 3 pounds. The patient reports a decreased portion size and loss of appetite.  Kimberley Andrade denies n/v/d/regurg/reflux and reports dysphagia with medications. The patient c/o appropriate post-op incisional discomfort that is improving. she is doing well with protein and water intake so far. Taking their vitamins, walking and using IS. Denies fevers, chills, chest pain or shortness of air.      Diet and Exercise: Diet history reviewed and discussed with the patient. Weight loss/gains to date discussed with the patient. No carbonated beverage consumption and exercising regularly- walking frequently.   Supplements: multivitamins, B-12, calcium, iron, B-1 and Vitamin D.   Patient is taking blood thinner as prescribed: lovenox per rx  Current outpatient and discharge medications have been reconciled for the patient.  Reviewed by: PAIGE Fermin        Review of Systems   Gastrointestinal: Positive for abdominal pain (post op  ).   All other systems reviewed and are negative.      Patient Active Problem List   Diagnosis   • Vitamin D deficiency   • Essential hypertension   • Dietary counseling   • Diabetes mellitus type 2 in obese (HCC)   • Meniere disease   • Dyslipidemia   • ANNI (obstructive sleep apnea)   • Anxiety   • PCOS  (polycystic ovarian syndrome)   • History of DVT (deep vein thrombosis)   • History of Helicobacter pylori infection   • Fatty liver   • GERD (gastroesophageal reflux disease)   • Multiple joint pain   • Quit smoking within past year   • Hiatal hernia   • Morbid obesity with BMI of 50.0-59.9, adult (HCC)   • S/P laparoscopic sleeve gastrectomy       The following portions of the patient's history were reviewed and updated as appropriate: allergies, current medications, past family history, past medical history, past social history, past surgical history and problem list.    Vitals:    10/27/22 1008   BP: 139/83   Pulse: 90   Resp: 18   Temp: 97.7 °F (36.5 °C)       Physical Exam  Vitals reviewed.   Constitutional:       Appearance: Normal appearance. She is well-developed. She is obese.   HENT:      Head: Normocephalic and atraumatic.   Cardiovascular:      Rate and Rhythm: Normal rate and regular rhythm.      Heart sounds: Normal heart sounds.   Pulmonary:      Effort: Pulmonary effort is normal.      Breath sounds: Normal breath sounds.   Abdominal:      General: Bowel sounds are normal. There is no distension.      Palpations: Abdomen is soft.      Tenderness: There is abdominal tenderness (post op).   Musculoskeletal:         General: Normal range of motion.   Skin:     General: Skin is warm and dry.      Comments: Incisions healing well   Neurological:      Mental Status: She is alert and oriented to person, place, and time.   Psychiatric:         Behavior: Behavior normal.         Thought Content: Thought content normal.         Judgment: Judgment normal.         Assessment:   Post-op, the patient is improving.     Encounter Diagnoses   Name Primary?   • Morbid obesity with BMI of 50.0-59.9, adult (HCC) Yes   • S/P laparoscopic sleeve gastrectomy    • Dietary counseling        Plan:   Reviewed with patient the importance of following the manual for diet progression. Increase activity as tolerated. Continue  increasing daily intake of protein and water.   Return to work: the patient is to return to 3 weeks from their surgery date with no restrictions unless they develop medical problems in which we will see them back in the office. They received a note in our office today with their return to work date.  Activity restrictions: no lifting, pushing or pulling over 25lbs for 3 weeks.   Recommended patient be sure to get at least 70 grams of protein per day. Discussed with the patient the recommended amount of water per day to intake. Reviewed vitamin requirements. Be sure to do routine exercise and increase activity as tolerated. No asa, nsaids or steroids for 8 weeks if gastric sleeve procedure and lifelong if gastric bypass procedure.. Patient may use miralax as needed if necessary.     Instructions / Recommendations: dietary counseling recommended, recommended a daily protein intake of  grams, vitamin supplement(s) recommended, recommended exercising at least 150 minutes per week, behavior modifications recommended and instructed to call the office for concerns, questions, or problems.     The patient was instructed to follow up at one month follow up appt.     The patient was counseled regarding post op bariatric manual

## 2022-10-28 ENCOUNTER — READMISSION MANAGEMENT (OUTPATIENT)
Dept: CALL CENTER | Facility: HOSPITAL | Age: 47
End: 2022-10-28

## 2022-10-28 NOTE — OUTREACH NOTE
General Surgery Week 1 Survey    Flowsheet Row Responses   [facility] patient discharged from? [place]   Does the patient have one of the following disease processes/diagnoses(primary or secondary)? General Surgery   Week 1 attempt successful? Yes   Call start time 1225   Call end time 1227   Discharge diagnosis Morbid obesity with co-morbidities   Does the patient have all medications related to this admission filled (includes all antibiotics, pain medications, etc.) Yes   Is the patient taking all medications as directed (includes completed medication regime)? Yes   Does the patient have a follow up appointment scheduled with their surgeon? Yes   Has the patient kept scheduled appointments due by today? Yes   Comments seen surgeon on 10/27   Has home health visited the patient within 72 hours of discharge? N/A   Psychosocial issues? No   Did the patient receive a copy of their discharge instructions? Yes   What is the patient's perception of their health status since discharge? Improving   Nursing interventions Nurse provided patient education  [advised to follow discharge instructions]   Is the patient/caregiver able to teach back signs and symptoms of incisional infection? Fever   Is the patient/caregiver able to teach back the hierarchy of who to call/visit for symptoms/problems? PCP, Specialist, Home health nurse, Urgent Care, ED, 911 Yes   Week 1 call completed? Yes   Wrap up additional comments Doing well, all questions addressed.          BC MOLINA - Registered Nurse

## 2022-11-29 ENCOUNTER — OFFICE VISIT (OUTPATIENT)
Dept: BARIATRICS/WEIGHT MGMT | Facility: CLINIC | Age: 47
End: 2022-11-29

## 2022-11-29 VITALS
DIASTOLIC BLOOD PRESSURE: 80 MMHG | WEIGHT: 293 LBS | BODY MASS INDEX: 47.09 KG/M2 | HEIGHT: 66 IN | TEMPERATURE: 98 F | SYSTOLIC BLOOD PRESSURE: 135 MMHG | HEART RATE: 78 BPM

## 2022-11-29 DIAGNOSIS — E55.9 VITAMIN D DEFICIENCY: ICD-10-CM

## 2022-11-29 DIAGNOSIS — R53.82 CHRONIC FATIGUE: ICD-10-CM

## 2022-11-29 DIAGNOSIS — E11.69 DIABETES MELLITUS TYPE 2 IN OBESE: ICD-10-CM

## 2022-11-29 DIAGNOSIS — E28.2 PCOS (POLYCYSTIC OVARIAN SYNDROME): ICD-10-CM

## 2022-11-29 DIAGNOSIS — Z98.84 S/P LAPAROSCOPIC SLEEVE GASTRECTOMY: ICD-10-CM

## 2022-11-29 DIAGNOSIS — E66.9 DIABETES MELLITUS TYPE 2 IN OBESE: ICD-10-CM

## 2022-11-29 DIAGNOSIS — Z71.3 DIETARY COUNSELING: ICD-10-CM

## 2022-11-29 DIAGNOSIS — E66.01 MORBID OBESITY WITH BMI OF 50.0-59.9, ADULT: Primary | ICD-10-CM

## 2022-11-29 DIAGNOSIS — K76.0 FATTY LIVER: ICD-10-CM

## 2022-11-29 DIAGNOSIS — I10 ESSENTIAL HYPERTENSION: ICD-10-CM

## 2022-11-29 DIAGNOSIS — M25.50 MULTIPLE JOINT PAIN: ICD-10-CM

## 2022-11-29 DIAGNOSIS — G47.33 OSA (OBSTRUCTIVE SLEEP APNEA): ICD-10-CM

## 2022-11-29 PROCEDURE — 99024 POSTOP FOLLOW-UP VISIT: CPT | Performed by: NURSE PRACTITIONER

## 2022-11-29 NOTE — PROGRESS NOTES
[facility] BARIATRIC SURGERY  [address] [facility] [address]  [facility] [address] [phone]  [address] [facility] [address]  [facility] [address] [phone]  Dept: [phone]  11/29/2022      Kimberley Andrade.  [id number]  [id number]  1975  female      Chief Complaint   Patient presents with   • Post-op     1 MO GS  PD 10-24--22  Pt has questions about the medicines prescribed by Dr. Ribeiro and if she has to continue those specific ones for lifetime or branch off to something else.  Meds in question are -Multi-vitamin, uceriodol and uflovi       BH Post-Op Bariatric Surgery:   Kimberley Andrade is status post Laparoscopic Sleeve with paraesophageal hernia repair procedure, performed on 10/24/2022     HPI:   Today's weight is (!) 143 kg (316 lb) pounds, today's BMI is Body mass index is 51.77 kg/m².,has a  loss of 19 pounds since the last visit andweight loss since surgery is 22 pounds. The patient reports a decreased portion size and loss of appetite.      Kimberley Andrade denies nausea, vomiting, reflux, dysphagia and reports tolerating most regular foods.  Has been trying foods off family members plates when they go out to eat.  Has tried ramen noodles, noodles, wheat bread, crackers.  She has had some frothing after a chicken finger.  Has been drinking powerade and water.  States craves protein shakes.  Is drinking 2 per day.  Isn't hungry so does not snack usually.  Eats about 1 meal per a day.  Has tried steamed veggies which she likes.       Diet and Exercise: Diet history reviewed and discussed with the patient. Weight loss/gains to date discussed with the patient. The patient states they are eating 60-70 grams of protein per day. She reports eating 3 meals per day, a typical portion size of 1/4 cup, eating 0 snacks per day, drinking 6 or more 8-oz. glasses of water per day, no carbonated beverage consumption and exercising regularly.     Supplements: bmtv.     Review of  Systems   Constitutional: Positive for activity change and appetite change.   Respiratory: Negative for shortness of breath.    Cardiovascular: Negative for chest pain.   All other systems reviewed and are negative.      Patient Active Problem List   Diagnosis   • Vitamin D deficiency   • Essential hypertension   • Dietary counseling   • Diabetes mellitus type 2 in obese ([facility])   • Meniere disease   • Dyslipidemia   • ANNI (obstructive sleep apnea)   • Anxiety   • PCOS (polycystic ovarian syndrome)   • History of DVT (deep vein thrombosis)   • History of Helicobacter pylori infection   • Fatty liver   • GERD (gastroesophageal reflux disease)   • Multiple joint pain   • Quit smoking within past year   • Hiatal hernia   • Morbid obesity with BMI of 50.0-59.9, adult ([facility])   • S/P laparoscopic sleeve gastrectomy   • Chronic fatigue       Past Medical History:   Diagnosis Date   • Anxiety and depression    • Arthritis    • Asthma    • Bleeds easily ([facility])    • Colon polyp    • COPD (chronic obstructive pulmonary disease) ([facility])    • Depression    • Diabetes mellitus ([facility])    • Dizziness    • GERD (gastroesophageal reflux disease)    • Hiatal hernia    • Hidradenitis     HAS BOIL UNDER RT ARM, PT INSTRUCTED TO CALL DR. LUZ'S OFFICE AND ALSO TO MONITOR   • History of Bell's palsy    • History of blood clots     IN RT ARM, 5-6 YEARS AGO   • History of shingles    • Hypertension    • IBS (irritable bowel syndrome)    • Iron deficiency anemia    • PONV (postoperative nausea and vomiting)     PT STATES ALL PAIN MEDS CAUSE NAUSEA   • Sleep apnea     CPAP   • Spinal stenosis    • Stress incontinence    • Tachycardia        The following portions of the patient's history were reviewed and updated as appropriate: allergies, current medications, past medical history, past surgical history and problem list.    Vitals:    11/29/22 1432   BP: 135/80   Pulse: 78   Temp: 98 °F (36.7 °C)       Physical Exam  Vitals reviewed.    Constitutional:       General: She is not in acute distress.     Appearance: Normal appearance. She is morbidly obese.   HENT:      Head: Normocephalic and atraumatic.      Mouth/Throat:      Mouth: Mucous membranes are moist.      Pharynx: Oropharynx is clear.   Eyes:      General: No scleral icterus.     Extraocular Movements: Extraocular movements intact.      Conjunctiva/sclera: Conjunctivae normal.      Pupils: Pupils are equal, round, and reactive to light.   Cardiovascular:      Rate and Rhythm: Normal rate and regular rhythm.      Heart sounds: Normal heart sounds. No murmur heard.    No gallop.   Pulmonary:      Effort: Pulmonary effort is normal. No respiratory distress.   Abdominal:      General: Bowel sounds are normal.      Palpations: Abdomen is soft.   Musculoskeletal:         General: Normal range of motion.      Cervical back: Normal range of motion and neck supple.   Skin:     General: Skin is warm and dry.   Neurological:      General: No focal deficit present.      Mental Status: She is alert and oriented to person, place, and time.   Psychiatric:         Mood and Affect: Mood normal.         Behavior: Behavior normal.         Thought Content: Thought content normal.         Judgment: Judgment normal.         Assessment:   Post-op, the patient is doing well.     Encounter Diagnoses   Name Primary?   • Morbid obesity with BMI of 50.0-59.9, adult (HCC) Yes   • S/P laparoscopic sleeve gastrectomy    • Essential hypertension    • Vitamin D deficiency    • Diabetes mellitus type 2 in obese (HCC)    • PCOS (polycystic ovarian syndrome)    • Fatty liver    • Multiple joint pain    • Chronic fatigue    • ANNI (obstructive sleep apnea)    • Dietary counseling        Plan:   Will get 1 month labs.   Discussed foods to avoid.  Should be avoiding simple carbs and focusing on high protein foods.  Does not like/tolerate dairy.    Encouraged patient to be sure to get plenty of lean protein per day through  small frequent meals all with a protein source.   Activity restrictions: none.   Recommended patient be sure to get at least 70 grams of protein per day by eating small, frequent meals all with high lean protein choices. Be sure to limit/cut back on daily carbohydrate intake. Discussed with the patient the recommended amount of water per day to intake- half of body weight in ounces. Reviewed vitamin requirements. Be sure to do routine exercise, 150 minutes per week minimum, including both cardio and strength training.     Instructions / Recommendations: dietary counseling recommended, recommended a daily protein intake of  grams, vitamin supplement(s) recommended, recommended exercising at least 150 minutes per week, behavior modifications recommended and instructed to call the office for concerns, questions, or problems.     The patient was instructed to follow up in 2 months .     Total time spent during this encounter today was 20 minutes.

## 2022-12-12 RX ORDER — PANTOPRAZOLE SODIUM 40 MG/1
40 TABLET, DELAYED RELEASE ORAL DAILY
Qty: 30 TABLET | Refills: 5 | Status: SHIPPED | OUTPATIENT
Start: 2022-12-12 | End: 2023-01-10

## 2022-12-19 ENCOUNTER — LAB (OUTPATIENT)
Dept: LAB | Facility: HOSPITAL | Age: 47
End: 2022-12-19

## 2022-12-19 DIAGNOSIS — E66.9 DIABETES MELLITUS TYPE 2 IN OBESE: ICD-10-CM

## 2022-12-19 DIAGNOSIS — E28.2 PCOS (POLYCYSTIC OVARIAN SYNDROME): ICD-10-CM

## 2022-12-19 DIAGNOSIS — E55.9 VITAMIN D DEFICIENCY: ICD-10-CM

## 2022-12-19 DIAGNOSIS — Z98.84 S/P LAPAROSCOPIC SLEEVE GASTRECTOMY: ICD-10-CM

## 2022-12-19 DIAGNOSIS — R53.82 CHRONIC FATIGUE: ICD-10-CM

## 2022-12-19 DIAGNOSIS — M25.50 MULTIPLE JOINT PAIN: ICD-10-CM

## 2022-12-19 DIAGNOSIS — E11.69 DIABETES MELLITUS TYPE 2 IN OBESE: ICD-10-CM

## 2022-12-19 DIAGNOSIS — Z71.3 DIETARY COUNSELING: ICD-10-CM

## 2022-12-19 DIAGNOSIS — I10 ESSENTIAL HYPERTENSION: ICD-10-CM

## 2022-12-19 DIAGNOSIS — G47.33 OSA (OBSTRUCTIVE SLEEP APNEA): ICD-10-CM

## 2022-12-19 DIAGNOSIS — E66.01 MORBID OBESITY WITH BMI OF 50.0-59.9, ADULT: ICD-10-CM

## 2022-12-19 DIAGNOSIS — K76.0 FATTY LIVER: ICD-10-CM

## 2022-12-19 LAB
25(OH)D3 SERPL-MCNC: 75.9 NG/ML (ref 30–100)
ALBUMIN SERPL-MCNC: 4.6 G/DL (ref 3.5–5.2)
ALBUMIN/GLOB SERPL: 1.8 G/DL
ALP SERPL-CCNC: 112 U/L (ref 39–117)
ALT SERPL W P-5'-P-CCNC: 24 U/L (ref 1–33)
ANION GAP SERPL CALCULATED.3IONS-SCNC: 14 MMOL/L (ref 5–15)
AST SERPL-CCNC: 17 U/L (ref 1–32)
BASOPHILS # BLD AUTO: 0.06 10*3/MM3 (ref 0–0.2)
BASOPHILS NFR BLD AUTO: 0.4 % (ref 0–1.5)
BILIRUB SERPL-MCNC: 0.8 MG/DL (ref 0–1.2)
BUN SERPL-MCNC: 9 MG/DL (ref 6–20)
BUN/CREAT SERPL: 12.9 (ref 7–25)
CALCIUM SPEC-SCNC: 10.2 MG/DL (ref 8.6–10.5)
CHLORIDE SERPL-SCNC: 104 MMOL/L (ref 98–107)
CO2 SERPL-SCNC: 19 MMOL/L (ref 22–29)
CREAT SERPL-MCNC: 0.7 MG/DL (ref 0.57–1)
DEPRECATED RDW RBC AUTO: 46.9 FL (ref 37–54)
EGFRCR SERPLBLD CKD-EPI 2021: 107.5 ML/MIN/1.73
EOSINOPHIL # BLD AUTO: 0.19 10*3/MM3 (ref 0–0.4)
EOSINOPHIL NFR BLD AUTO: 1.3 % (ref 0.3–6.2)
ERYTHROCYTE [DISTWIDTH] IN BLOOD BY AUTOMATED COUNT: 14.6 % (ref 12.3–15.4)
FERRITIN SERPL-MCNC: 48.4 NG/ML (ref 13–150)
FOLATE SERPL-MCNC: >20 NG/ML (ref 4.78–24.2)
GLOBULIN UR ELPH-MCNC: 2.6 GM/DL
GLUCOSE SERPL-MCNC: 156 MG/DL (ref 65–99)
HBA1C MFR BLD: 6.2 % (ref 4.8–5.6)
HCT VFR BLD AUTO: 48.6 % (ref 34–46.6)
HGB BLD-MCNC: 16.5 G/DL (ref 12–15.9)
IMM GRANULOCYTES # BLD AUTO: 0.07 10*3/MM3 (ref 0–0.05)
IMM GRANULOCYTES NFR BLD AUTO: 0.5 % (ref 0–0.5)
IRON 24H UR-MRATE: 74 MCG/DL (ref 37–145)
LYMPHOCYTES # BLD AUTO: 4.34 10*3/MM3 (ref 0.7–3.1)
LYMPHOCYTES NFR BLD AUTO: 29.1 % (ref 19.6–45.3)
MCH RBC QN AUTO: 29.7 PG (ref 26.6–33)
MCHC RBC AUTO-ENTMCNC: 34 G/DL (ref 31.5–35.7)
MCV RBC AUTO: 87.6 FL (ref 79–97)
MONOCYTES # BLD AUTO: 0.64 10*3/MM3 (ref 0.1–0.9)
MONOCYTES NFR BLD AUTO: 4.3 % (ref 5–12)
NEUTROPHILS NFR BLD AUTO: 64.4 % (ref 42.7–76)
NEUTROPHILS NFR BLD AUTO: 9.61 10*3/MM3 (ref 1.7–7)
NRBC BLD AUTO-RTO: 0 /100 WBC (ref 0–0.2)
PLATELET # BLD AUTO: 296 10*3/MM3 (ref 140–450)
PMV BLD AUTO: 11.2 FL (ref 6–12)
POTASSIUM SERPL-SCNC: 3.9 MMOL/L (ref 3.5–5.2)
PREALB SERPL-MCNC: 24 MG/DL (ref 20–40)
PROT SERPL-MCNC: 7.2 G/DL (ref 6–8.5)
RBC # BLD AUTO: 5.55 10*6/MM3 (ref 3.77–5.28)
SODIUM SERPL-SCNC: 137 MMOL/L (ref 136–145)
WBC NRBC COR # BLD: 14.91 10*3/MM3 (ref 3.4–10.8)

## 2022-12-19 PROCEDURE — 82306 VITAMIN D 25 HYDROXY: CPT

## 2022-12-19 PROCEDURE — 80053 COMPREHEN METABOLIC PANEL: CPT | Performed by: NURSE PRACTITIONER

## 2022-12-19 PROCEDURE — 82728 ASSAY OF FERRITIN: CPT

## 2022-12-19 PROCEDURE — 36415 COLL VENOUS BLD VENIPUNCTURE: CPT | Performed by: NURSE PRACTITIONER

## 2022-12-19 PROCEDURE — 83921 ORGANIC ACID SINGLE QUANT: CPT

## 2022-12-19 PROCEDURE — 82746 ASSAY OF FOLIC ACID SERUM: CPT

## 2022-12-19 PROCEDURE — 83540 ASSAY OF IRON: CPT

## 2022-12-19 PROCEDURE — 85025 COMPLETE CBC W/AUTO DIFF WBC: CPT | Performed by: NURSE PRACTITIONER

## 2022-12-19 PROCEDURE — 84134 ASSAY OF PREALBUMIN: CPT

## 2022-12-19 PROCEDURE — 84425 ASSAY OF VITAMIN B-1: CPT

## 2022-12-19 PROCEDURE — 83036 HEMOGLOBIN GLYCOSYLATED A1C: CPT | Performed by: NURSE PRACTITIONER

## 2022-12-21 ENCOUNTER — TELEPHONE (OUTPATIENT)
Dept: BARIATRICS/WEIGHT MGMT | Facility: CLINIC | Age: 47
End: 2022-12-21

## 2022-12-21 DIAGNOSIS — R11.2 NAUSEA AND VOMITING IN ADULT PATIENT: Primary | ICD-10-CM

## 2022-12-22 LAB
METHYLMALONATE SERPL-SCNC: 117 NMOL/L (ref 0–378)
VIT B1 BLD-SCNC: 278 NMOL/L (ref 66.5–200)

## 2023-01-10 RX ORDER — PANTOPRAZOLE SODIUM 40 MG/1
40 TABLET, DELAYED RELEASE ORAL DAILY
Qty: 30 TABLET | Refills: 5 | Status: SHIPPED | OUTPATIENT
Start: 2023-01-10

## 2023-02-21 ENCOUNTER — OFFICE VISIT (OUTPATIENT)
Dept: BARIATRICS/WEIGHT MGMT | Facility: CLINIC | Age: 48
End: 2023-02-21
Payer: COMMERCIAL

## 2023-02-21 ENCOUNTER — PREP FOR SURGERY (OUTPATIENT)
Dept: OTHER | Facility: HOSPITAL | Age: 48
End: 2023-02-21
Payer: COMMERCIAL

## 2023-02-21 VITALS
HEART RATE: 80 BPM | HEIGHT: 66 IN | TEMPERATURE: 97.5 F | WEIGHT: 293 LBS | DIASTOLIC BLOOD PRESSURE: 87 MMHG | BODY MASS INDEX: 47.09 KG/M2 | SYSTOLIC BLOOD PRESSURE: 131 MMHG

## 2023-02-21 DIAGNOSIS — Z71.3 DIETARY COUNSELING: ICD-10-CM

## 2023-02-21 DIAGNOSIS — Z98.84 S/P LAPAROSCOPIC SLEEVE GASTRECTOMY: ICD-10-CM

## 2023-02-21 DIAGNOSIS — R11.2 NAUSEA AND VOMITING IN ADULT PATIENT: ICD-10-CM

## 2023-02-21 DIAGNOSIS — E66.01 MORBID OBESITY WITH BMI OF 45.0-49.9, ADULT: Primary | ICD-10-CM

## 2023-02-21 DIAGNOSIS — R13.10 DYSPHAGIA, UNSPECIFIED TYPE: ICD-10-CM

## 2023-02-21 DIAGNOSIS — E66.01 MORBID OBESITY WITH BMI OF 45.0-49.9, ADULT: ICD-10-CM

## 2023-02-21 DIAGNOSIS — E66.01 MORBID OBESITY WITH BMI OF 50.0-59.9, ADULT: Primary | ICD-10-CM

## 2023-02-21 PROCEDURE — 99213 OFFICE O/P EST LOW 20 MIN: CPT | Performed by: NURSE PRACTITIONER

## 2023-02-21 RX ORDER — SODIUM CHLORIDE, SODIUM LACTATE, POTASSIUM CHLORIDE, CALCIUM CHLORIDE 600; 310; 30; 20 MG/100ML; MG/100ML; MG/100ML; MG/100ML
30 INJECTION, SOLUTION INTRAVENOUS CONTINUOUS
Status: CANCELLED | OUTPATIENT
Start: 2023-03-03

## 2023-02-21 RX ORDER — ONDANSETRON 4 MG/1
4 TABLET, ORALLY DISINTEGRATING ORAL EVERY 4 HOURS PRN
Qty: 30 TABLET | Refills: 0 | Status: SHIPPED | OUTPATIENT
Start: 2023-02-21

## 2023-02-21 NOTE — PROGRESS NOTES
[facility] BARIATRIC SURGERY  [address] [facility] [address]  [facility] [address]  [phone]  [address] [facility] [address]  [facility] [id number] [phone]  Dept: [phone]  2/21/2023      Kimberley Andrade.  [id number]  [id number]  1975  female      Chief Complaint   Patient presents with   • Follow-up     4 mo fu sleeve        BH Post-Op Bariatric Surgery:   Kimberley Andrade is status post Laparoscopic Sleeve with PEHR procedure, performed on 10/24/2022     HPI:   Today's weight is (!) 137 kg (301 lb 8 oz) pounds, today's BMI is Body mass index is 49.39 kg/m².,has a  loss of 15 pounds since the last visit andweight loss since surgery is 37 pounds. The patient reports a decreased portion size and loss of appetite.      Kimberley Andrade reports nausea, vomiting, and dysphagia.  States that wakes up during the night with vomiting protein shake or water.  Can tolerate only about 1 oz of chicken at a time.  Feels full almost all the time.  She weighs everything that she eats.  Weighs out 2 oz but can only tolerate about 1 oz of food.  Has been constatnly nauseated.  Is nauseated with water, protein shake.  Has been taking Ursodiol.  Has been drinking 2 protein shakes per day as well as protein bar.  She joined [employer] and is going 4 times per week.     Diet and Exercise: Diet history reviewed and discussed with the patient. Weight loss/gains to date discussed with the patient. The patient states they are eating 85 grams of protein per day. She reports eating 2 meals per day, a typical portion size of 1/4 cup, eating 1 snacks per day, drinking 5 or more 8-oz. glasses of water per day, no carbonated beverage consumption and exercising regularly.     Supplements: bmtv.     Review of Systems   Constitutional: Positive for activity change and appetite change.   Respiratory: Negative for chest tightness and shortness of breath.    Cardiovascular: Negative for chest pain.    Gastrointestinal: Positive for abdominal pain, nausea and vomiting.   All other systems reviewed and are negative.      Patient Active Problem List   Diagnosis   • Vitamin D deficiency   • Essential hypertension   • Dietary counseling   • Diabetes mellitus type 2 in obese (HCC)   • Meniere disease   • Dyslipidemia   • ANNI (obstructive sleep apnea)   • Anxiety   • PCOS (polycystic ovarian syndrome)   • History of DVT (deep vein thrombosis)   • History of Helicobacter pylori infection   • Fatty liver   • GERD (gastroesophageal reflux disease)   • Multiple joint pain   • Quit smoking within past year   • Hiatal hernia   • Morbid obesity with BMI of 45.0-49.9, adult (HCC)   • S/P laparoscopic sleeve gastrectomy   • Chronic fatigue   • Dysphagia       Past Medical History:   Diagnosis Date   • Anxiety and depression    • Arthritis    • Asthma    • Bleeds easily (HCC)    • Colon polyp    • COPD (chronic obstructive pulmonary disease) (HCC)    • Depression    • Diabetes mellitus (HCC)    • Dizziness    • GERD (gastroesophageal reflux disease)    • Hiatal hernia    • Hidradenitis     HAS BOIL UNDER RT ARM, PT INSTRUCTED TO CALL DR. LUZ'S OFFICE AND ALSO TO MONITOR   • History of Bell's palsy    • History of blood clots     IN RT ARM, 5-6 YEARS AGO   • History of shingles    • Hypertension    • IBS (irritable bowel syndrome)    • Iron deficiency anemia    • PONV (postoperative nausea and vomiting)     PT STATES ALL PAIN MEDS CAUSE NAUSEA   • Sleep apnea     CPAP   • Spinal stenosis    • Stress incontinence    • Tachycardia        The following portions of the patient's history were reviewed and updated as appropriate: allergies, current medications, past medical history, past surgical history and problem list.    Vitals:    02/21/23 0906   BP: 131/87   Pulse: 80   Temp: 97.5 °F (36.4 °C)       Physical Exam  Vitals reviewed.   Constitutional:       General: She is not in acute distress.     Appearance: Normal  appearance. She is morbidly obese.   HENT:      Head: Normocephalic and atraumatic.      Mouth/Throat:      Mouth: Mucous membranes are moist.      Pharynx: Oropharynx is clear.   Eyes:      General: No scleral icterus.     Extraocular Movements: Extraocular movements intact.      Conjunctiva/sclera: Conjunctivae normal.      Pupils: Pupils are equal, round, and reactive to light.   Cardiovascular:      Rate and Rhythm: Normal rate and regular rhythm.      Heart sounds: Normal heart sounds. No murmur heard.    No gallop.   Pulmonary:      Effort: Pulmonary effort is normal. No respiratory distress.   Abdominal:      General: Bowel sounds are normal.      Palpations: Abdomen is soft.   Musculoskeletal:         General: Normal range of motion.      Cervical back: Normal range of motion and neck supple.   Skin:     General: Skin is warm and dry.   Neurological:      General: No focal deficit present.      Mental Status: She is alert and oriented to person, place, and time.   Psychiatric:         Mood and Affect: Mood normal.         Behavior: Behavior normal.         Thought Content: Thought content normal.         Judgment: Judgment normal.         Assessment:   Post-op, the patient is struggling with constant nausea.  Has been getting Zofran from pcp.  Awaking at night with vomiting    Encounter Diagnoses   Name Primary?   • Morbid obesity with BMI of 45.0-49.9, adult (HCC) Yes   • S/P laparoscopic sleeve gastrectomy    • Dietary counseling        Plan:   Refill Zofran  Will schedule EGD.   Encouraged patient to be sure to get plenty of lean protein per day through small frequent meals all with a protein source.   Activity restrictions: none.   Recommended patient be sure to get at least 70 grams of protein per day by eating small, frequent meals all with high lean protein choices. Be sure to limit/cut back on daily carbohydrate intake. Discussed with the patient the recommended amount of water per day to intake-  half of body weight in ounces. Reviewed vitamin requirements. Be sure to do routine exercise, 150 minutes per week minimum, including both cardio and strength training.     Instructions / Recommendations: dietary counseling recommended, recommended a daily protein intake of  grams, vitamin supplement(s) recommended, recommended exercising at least 150 minutes per week, behavior modifications recommended and instructed to call the office for concerns, questions, or problems.     The patient was instructed to follow up in 3 months .     Total time spent during this encounter today was 25 minutes.

## 2023-03-02 ENCOUNTER — TELEPHONE (OUTPATIENT)
Dept: BARIATRICS/WEIGHT MGMT | Facility: CLINIC | Age: 48
End: 2023-03-02
Payer: COMMERCIAL

## 2023-03-03 ENCOUNTER — ANESTHESIA EVENT (OUTPATIENT)
Dept: GASTROENTEROLOGY | Facility: HOSPITAL | Age: 48
End: 2023-03-03
Payer: COMMERCIAL

## 2023-03-03 ENCOUNTER — HOSPITAL ENCOUNTER (OUTPATIENT)
Facility: HOSPITAL | Age: 48
Setting detail: HOSPITAL OUTPATIENT SURGERY
Discharge: HOME | End: 2023-03-03
Attending: SURGERY | Admitting: SURGERY
Payer: COMMERCIAL

## 2023-03-03 ENCOUNTER — ANESTHESIA (OUTPATIENT)
Dept: GASTROENTEROLOGY | Facility: HOSPITAL | Age: 48
End: 2023-03-03
Payer: COMMERCIAL

## 2023-03-03 VITALS
HEIGHT: 65 IN | RESPIRATION RATE: 20 BRPM | DIASTOLIC BLOOD PRESSURE: 69 MMHG | OXYGEN SATURATION: 95 % | HEART RATE: 70 BPM | BODY MASS INDEX: 48.82 KG/M2 | WEIGHT: 293 LBS | SYSTOLIC BLOOD PRESSURE: 125 MMHG

## 2023-03-03 DIAGNOSIS — R11.2 NAUSEA AND VOMITING IN ADULT PATIENT: ICD-10-CM

## 2023-03-03 DIAGNOSIS — E66.01 MORBID OBESITY WITH BMI OF 45.0-49.9, ADULT: ICD-10-CM

## 2023-03-03 DIAGNOSIS — R13.10 DYSPHAGIA, UNSPECIFIED TYPE: ICD-10-CM

## 2023-03-03 DIAGNOSIS — Z98.84 S/P LAPAROSCOPIC SLEEVE GASTRECTOMY: ICD-10-CM

## 2023-03-03 PROCEDURE — 0 LIDOCAINE 1 % SOLUTION: Performed by: REGISTERED NURSE

## 2023-03-03 PROCEDURE — 25010000002 PROPOFOL 10 MG/ML EMULSION: Performed by: REGISTERED NURSE

## 2023-03-03 PROCEDURE — 43245 EGD DILATE STRICTURE: CPT | Performed by: SURGERY

## 2023-03-03 PROCEDURE — C1726 CATH, BAL DIL, NON-VASCULAR: HCPCS | Performed by: SURGERY

## 2023-03-03 RX ORDER — SODIUM CHLORIDE, SODIUM LACTATE, POTASSIUM CHLORIDE, CALCIUM CHLORIDE 600; 310; 30; 20 MG/100ML; MG/100ML; MG/100ML; MG/100ML
30 INJECTION, SOLUTION INTRAVENOUS CONTINUOUS
Status: DISCONTINUED | OUTPATIENT
Start: 2023-03-03 | End: 2023-03-03 | Stop reason: HOSPADM

## 2023-03-03 RX ORDER — LIDOCAINE HYDROCHLORIDE 10 MG/ML
INJECTION, SOLUTION INFILTRATION; PERINEURAL AS NEEDED
Status: DISCONTINUED | OUTPATIENT
Start: 2023-03-03 | End: 2023-03-03 | Stop reason: SURG

## 2023-03-03 RX ORDER — ONDANSETRON 2 MG/ML
4 INJECTION INTRAMUSCULAR; INTRAVENOUS ONCE AS NEEDED
Status: DISCONTINUED | OUTPATIENT
Start: 2023-03-03 | End: 2023-03-03 | Stop reason: HOSPADM

## 2023-03-03 RX ORDER — PROPOFOL 10 MG/ML
VIAL (ML) INTRAVENOUS AS NEEDED
Status: DISCONTINUED | OUTPATIENT
Start: 2023-03-03 | End: 2023-03-03 | Stop reason: SURG

## 2023-03-03 RX ADMIN — LIDOCAINE HYDROCHLORIDE 30 MG: 10 INJECTION, SOLUTION INFILTRATION; PERINEURAL at 07:19

## 2023-03-03 RX ADMIN — PROPOFOL 100 MG: 10 INJECTION, EMULSION INTRAVENOUS at 07:19

## 2023-03-03 RX ADMIN — SODIUM CHLORIDE, POTASSIUM CHLORIDE, SODIUM LACTATE AND CALCIUM CHLORIDE 30 ML/HR: 600; 310; 30; 20 INJECTION, SOLUTION INTRAVENOUS at 06:49

## 2023-03-03 RX ADMIN — PROPOFOL 30 MG: 10 INJECTION, EMULSION INTRAVENOUS at 07:20

## 2023-03-03 NOTE — ANESTHESIA POSTPROCEDURE EVALUATION
Patient: Kimberley Andrade    Procedure Summary     Date: 03/03/23 Room / Location:  [facility]    Anesthesia Start: 0716 Anesthesia Stop: 0733    Procedure: ESOPHAGOGASTRODUODENOSCOPY WITH BALLOON DILATATION #20 (Esophagus) Diagnosis:       S/P laparoscopic sleeve gastrectomy      Nausea and vomiting in adult patient      Dysphagia, unspecified type      Morbid obesity with BMI of 45.0-49.9, adult ([facility])      (S/P laparoscopic sleeve gastrectomy [Z98.84])      (Nausea and vomiting in adult patient [R11.2])      (Dysphagia, unspecified type [R13.10])      (Morbid obesity with BMI of 45.0-49.9, adult ([facility]) [E66.01, Z68.42])    Surgeons: Tai Ribeiro Jr., MD Provider: Washington Cueva MD    Anesthesia Type: general ASA Status: 3          Anesthesia Type: general    Vitals  Vitals Value Taken Time   /68 03/03/23 0741   Temp     Pulse 66 03/03/23 0741   Resp 20 03/03/23 0741   SpO2 92 % 03/03/23 0741           Post Anesthesia Care and Evaluation    Patient location during evaluation: PACU  Patient participation: complete - patient participated  Level of consciousness: awake  Pain score: 1  Pain management: adequate    Airway patency: patent  Anesthetic complications: No anesthetic complications  PONV Status: none  Cardiovascular status: acceptable  Respiratory status: acceptable  Hydration status: acceptable

## 2023-03-03 NOTE — DISCHARGE INSTRUCTIONS
Don't drink liquids with your meals, drink your liquids 30 to 45 minutes after finishing your meal.

## 2023-03-03 NOTE — INTERVAL H&P NOTE
The risks and benefits of the procedure were discussed with the patient in detail and all questions were answered.  Possibility of perforation, bleeding, aspiration, anoxic brain injury, respiratory and/or cardiac arrest and death were discussed.  Consent will be signed and witnessed.  H&P reviewed. The patient was examined and there are no changes to the H&P.

## 2023-03-03 NOTE — ANESTHESIA PREPROCEDURE EVALUATION
Anesthesia Evaluation     Patient summary reviewed   history of anesthetic complications: PONV               Airway   Mallampati: II  Neck ROM: full  No difficulty expected  Dental      Pulmonary    (+) COPD, sleep apnea,   Cardiovascular     ECG reviewed  Rhythm: regular    (+) hypertension, DVT,       Neuro/Psych  GI/Hepatic/Renal/Endo    (+) obesity,  GERD,  diabetes mellitus,     Musculoskeletal     Abdominal    Substance History      OB/GYN          Other                        Anesthesia Plan    ASA 3     general       Anesthetic plan, risks, benefits, and alternatives have been provided, discussed and informed consent has been obtained with: patient.    Use of blood products discussed with patient .       CODE STATUS:

## 2023-03-03 NOTE — OP NOTE
Surgeon: Tai Ribeiro Jr., M.D.    Preoperative Diagnosis: Dysphagia with solids status post laparoscopic sleeve gastrectomy    Postoperative Diagnosis: Gastritis    Procedure Performed: Transoral esophagogastroduodenoscopy with 18-20 balloon dilatation of pylorus    Indications: 47-year-old female status post laparoscopic sleeve gastrectomy October 2022 with complaints of dysphagia with solids.    Procedure:     The procedure, indications, preparation and potential, patient were explained to the patient, who indicated understanding and signed the corresponding consent forms.  The patient was identified, taken to the endoscopy suite, and placed on the left side down decubitus position.  The patient underwent a MAC anesthesia and was appropriately monitored through the case by the anesthesia personnel using continuous pulse oximetry, blood pressure, and cardiac monitoring.  A bite block was placed.  After adequate IV sedation and using a transoral technique a lubed flexible endoscope was placed in the hypopharynx and advanced to the second portion of the duodenum.  The pylorus was mildly strictured and the scope had to be advanced with some pressure into the duodenum.  The scope was then withdrawn back into the gastric sleeve.  There was no stricture noted in the gastric sleeve unless dictated below.  No polyps or ulcers were seen unless dictated below.  The scope was then withdrawn back into the esophagus.  The Z line was regular and no erosive esophagitis seen unless dictated below.  Scope was then advanced back down into the antrum.  A 18-20 balloon catheter was then advanced across the pylorus and taken up in sequence and each level held for approximately 1 minute.  The catheter was deflated and removed.  The pylorus dilated up nicely.  The scope was then completely withdrawn after decompressing the stomach.  The patient tolerated the procedure well and left the endoscopy suite in stable condition.    The  sleeve was of normal size without stricture or dilatation.  The pylorus opened up nicely after dilatation.  Minimal erythema noted in the antrum.  No ulcers or other abnormalities seen.  The Z-line was regular and no erosive esophagitis noted.    Recommendations:     Follow-up in the office as scheduled instructed patient about not drinking with her meals and waiting 30 to 45 minutes after each meal.

## 2023-05-23 ENCOUNTER — OFFICE VISIT (OUTPATIENT)
Dept: BARIATRICS/WEIGHT MGMT | Facility: CLINIC | Age: 48
End: 2023-05-23
Payer: COMMERCIAL

## 2023-05-23 ENCOUNTER — PREP FOR SURGERY (OUTPATIENT)
Dept: OTHER | Facility: HOSPITAL | Age: 48
End: 2023-05-23
Payer: COMMERCIAL

## 2023-05-23 VITALS
TEMPERATURE: 97.3 F | HEIGHT: 66 IN | SYSTOLIC BLOOD PRESSURE: 119 MMHG | HEART RATE: 66 BPM | BODY MASS INDEX: 47.09 KG/M2 | DIASTOLIC BLOOD PRESSURE: 77 MMHG | WEIGHT: 293 LBS

## 2023-05-23 DIAGNOSIS — K21.00 REFLUX ESOPHAGITIS: Primary | ICD-10-CM

## 2023-05-23 DIAGNOSIS — K44.9 HIATAL HERNIA: ICD-10-CM

## 2023-05-23 DIAGNOSIS — I10 ESSENTIAL HYPERTENSION: ICD-10-CM

## 2023-05-23 DIAGNOSIS — R53.82 CHRONIC FATIGUE: ICD-10-CM

## 2023-05-23 DIAGNOSIS — K21.9 GASTROESOPHAGEAL REFLUX DISEASE, UNSPECIFIED WHETHER ESOPHAGITIS PRESENT: ICD-10-CM

## 2023-05-23 DIAGNOSIS — R13.10 DYSPHAGIA, UNSPECIFIED TYPE: ICD-10-CM

## 2023-05-23 DIAGNOSIS — Z98.84 S/P LAPAROSCOPIC SLEEVE GASTRECTOMY: ICD-10-CM

## 2023-05-23 DIAGNOSIS — R68.81 EARLY SATIETY: ICD-10-CM

## 2023-05-23 DIAGNOSIS — E11.69 DIABETES MELLITUS TYPE 2 IN OBESE: ICD-10-CM

## 2023-05-23 DIAGNOSIS — R10.11 RIGHT UPPER QUADRANT ABDOMINAL PAIN: ICD-10-CM

## 2023-05-23 DIAGNOSIS — E66.9 DIABETES MELLITUS TYPE 2 IN OBESE: ICD-10-CM

## 2023-05-23 DIAGNOSIS — G47.33 OSA (OBSTRUCTIVE SLEEP APNEA): ICD-10-CM

## 2023-05-23 DIAGNOSIS — E66.01 MORBID OBESITY WITH BMI OF 45.0-49.9, ADULT: Primary | ICD-10-CM

## 2023-05-23 DIAGNOSIS — E55.9 VITAMIN D DEFICIENCY: ICD-10-CM

## 2023-05-23 PROBLEM — Z87.891 QUIT SMOKING WITHIN PAST YEAR: Status: RESOLVED | Noted: 2022-01-25 | Resolved: 2023-05-23

## 2023-05-23 PROCEDURE — 1159F MED LIST DOCD IN RCRD: CPT | Performed by: NURSE PRACTITIONER

## 2023-05-23 PROCEDURE — 99214 OFFICE O/P EST MOD 30 MIN: CPT | Performed by: NURSE PRACTITIONER

## 2023-05-23 PROCEDURE — 3078F DIAST BP <80 MM HG: CPT | Performed by: NURSE PRACTITIONER

## 2023-05-23 PROCEDURE — 1160F RVW MEDS BY RX/DR IN RCRD: CPT | Performed by: NURSE PRACTITIONER

## 2023-05-23 PROCEDURE — 3074F SYST BP LT 130 MM HG: CPT | Performed by: NURSE PRACTITIONER

## 2023-05-23 RX ORDER — POLYETHYLENE GLYCOL 3350 17 G/17G
POWDER, FOR SOLUTION ORAL
COMMUNITY
Start: 2023-04-05

## 2023-05-23 RX ORDER — SODIUM CHLORIDE, SODIUM LACTATE, POTASSIUM CHLORIDE, CALCIUM CHLORIDE 600; 310; 30; 20 MG/100ML; MG/100ML; MG/100ML; MG/100ML
30 INJECTION, SOLUTION INTRAVENOUS CONTINUOUS
OUTPATIENT
Start: 2023-05-30

## 2023-05-23 NOTE — H&P (VIEW-ONLY)
[facility] BARIATRIC SURGERY  [address] [facility] [address]  Psychiatric [address]  [phone]  [address] [address]  [phone]  Dept: [phone]  5/23/2023      Kimberley Andrade.  [id number]  [id number]  1975  female      Chief Complaint   Patient presents with   • Follow-up     7 mo fu sleeve        BH Post-Op Bariatric Surgery:   Kimberley Andrade is status post Laparoscopic Sleeve/HH procedure, performed on 10/24/22     HPI:   Today's weight is 135 kg (297 lb) pounds, today's BMI is Body mass index is 48.65 kg/m².,@ has a  loss of 4 pounds since the last visit and@ weight loss since surgery is 41 pounds. The patient reports a decreased portion size and loss of appetite.      Kimberley Andrade underwent EGD with dilation on 3/3/23 and since she has been having less nausea. She is still having marked heartburn and reflux. She still having nocturnal reflux and frothing during the nighttime. She is tolerating 2-2.5oz total per meal. She has been having a full glass of water, protein shake, protein bar by 9am. May have a bar by 12pm or turkey, cheese, carrots or fruit.     She has been having right upper quadrant pain that radiates across her midline to her left shoulder. She did have a gallbladder ultrasound which did not show signs of gallbladder enlargement or changes to the biliary tree but did show hepatomegaly and some changes that could be related to fatty liver disease.  She has had 4 of these episodes. She doesn't tolerate processed carbohydrates well. She does tolerates green and high fiber veggies well in small amounts if cooked well.     She is taking protonix at 40mg, she is taking tums intermittently throughout the day as well.      Diet and Exercise: Diet history reviewed and discussed with the patient. Weight loss/gains to date discussed with the patient. The patient states they are eating 60 grams of protein per day. She reports eating  3 meals per day, a typical portion size of 1/2 cup, eating 1-2 snacks per day, drinking 5-6 or more 8-oz. glasses of water per day, no carbonated beverage consumption and exercising regularly.     Supplements: chewable bariatric MTV BID.     Review of Systems   Constitutional: Positive for appetite change. Negative for fatigue and unexpected weight change.   HENT: Positive for trouble swallowing (more than 2-2.5oz of solids, early satiety).    Eyes: Negative.    Respiratory: Negative.    Cardiovascular: Negative.  Negative for leg swelling.   Gastrointestinal: Negative for abdominal distention, abdominal pain, constipation, diarrhea, nausea and vomiting.        Heartburn, nocturnal reflux, nocturnal frothing   Genitourinary: Negative for difficulty urinating, frequency and urgency.   Musculoskeletal: Negative for back pain.   Skin: Negative.    Psychiatric/Behavioral: Negative.    All other systems reviewed and are negative.      Patient Active Problem List   Diagnosis   • Vitamin D deficiency   • Essential hypertension   • Dietary counseling   • Diabetes mellitus type 2 in obese   • Meniere disease   • Dyslipidemia   • ANNI (obstructive sleep apnea)   • Anxiety   • PCOS (polycystic ovarian syndrome)   • History of DVT (deep vein thrombosis)   • History of Helicobacter pylori infection   • Fatty liver   • GERD (gastroesophageal reflux disease)   • Multiple joint pain   • Quit smoking within past year   • Hiatal hernia   • Morbid obesity with BMI of 45.0-49.9, adult   • S/P laparoscopic sleeve gastrectomy   • Chronic fatigue   • Dysphagia   • Nausea and vomiting in adult patient       Past Medical History:   Diagnosis Date   • Anxiety and depression    • Arthritis    • Asthma    • Bleeds easily    • Colon polyp    • COPD (chronic obstructive pulmonary disease)    • Depression    • Diabetes mellitus    • Dizziness    • GERD (gastroesophageal reflux disease)    • Hiatal hernia    • Hidradenitis     HAS BOIL UNDER RT  ARM, PT INSTRUCTED TO CALL DR. LUZ'S OFFICE AND ALSO TO MONITOR   • History of Bell's palsy    • History of blood clots    • History of shingles    • Hypertension    • IBS (irritable bowel syndrome)    • Iron deficiency anemia    • PONV (postoperative nausea and vomiting)     PT STATES ALL PAIN MEDS CAUSE NAUSEA   • Sleep apnea     CPAP   • Spinal stenosis    • Stress incontinence    • Tachycardia        The following portions of the patient's history were reviewed and updated as appropriate: allergies, current medications, past family history, past medical history, past social history, past surgical history and problem list.    Vitals:    05/23/23 0909   BP: 119/77   Pulse: 66   Temp: 97.3 °F (36.3 °C)       Physical Exam  Vitals and nursing note reviewed.   Constitutional:       Appearance: She is well-developed. She is not diaphoretic.   Pulmonary:      Effort: Pulmonary effort is normal.   Neurological:      Mental Status: She is alert and oriented to person, place, and time.   Psychiatric:         Behavior: Behavior normal.         Assessment:   Post-op, the patient is struggling to advance her diet, get nutrition from solid foods, struggling with ongoing heartburn, reflux, nocturnal reflux and still has intermittent right upper quadrant pain that radiates across her chest and back.     Encounter Diagnoses   Name Primary?   • Morbid obesity with BMI of 45.0-49.9, adult Yes   • S/P laparoscopic sleeve gastrectomy    • Essential hypertension    • Vitamin D deficiency    • Diabetes mellitus type 2 in obese    • Gastroesophageal reflux disease, unspecified whether esophagitis present    • Hiatal hernia    • ANNI (obstructive sleep apnea)    • Chronic fatigue        Plan:   Proceed with HIDA scan to evaluate gb function. Repeat EGD with dilatation to further stretch pylorus to prevent reflux, early satiety and allow her to obtain more nutrition from solids.   Encouraged patient to be sure to get plenty of lean  protein per day through small frequent meals all with a protein source.   Activity restrictions: none.   Recommended patient be sure to get at least 70 grams of protein per day by eating small, frequent meals all with high lean protein choices. Be sure to limit/cut back on daily carbohydrate intake. Discussed with the patient the recommended amount of water per day to intake- half of body weight in ounces. Reviewed vitamin requirements. Be sure to do routine exercise, 150 minutes per week minimum, including both cardio and strength training.     Instructions / Recommendations: dietary counseling recommended, recommended a daily protein intake of  grams, vitamin supplement(s) recommended, recommended exercising at least 150 minutes per week, behavior modifications recommended and instructed to call the office for concerns, questions, or problems.     The patient was instructed to follow up in 3 months .   Total time spent during this encounter today was 35 minutes

## 2023-05-23 NOTE — PROGRESS NOTES
[facility] BARIATRIC SURGERY  [address] [facility] [address]  [facility] [address]  [phone]  [address] [address]  [phone]  Dept: [phone]  5/23/2023      Kimebrley Andrade.  [id number]  [id number]  1975  female      Chief Complaint   Patient presents with   • Follow-up     7 mo fu sleeve        BH Post-Op Bariatric Surgery:   Kimberley Andrade is status post Laparoscopic Sleeve/HH procedure, performed on 10/24/22     HPI:   Today's weight is 135 kg (297 lb) pounds, today's BMI is Body mass index is 48.65 kg/m².,@ has a  loss of 4 pounds since the last visit and@ weight loss since surgery is 41 pounds. The patient reports a decreased portion size and loss of appetite.      Kimberley Andrade underwent EGD with dilation on 3/3/23 and since she has been having less nausea. She is still having marked heartburn and reflux. She still having nocturnal reflux and frothing during the nighttime. She is tolerating 2-2.5oz total per meal. She has been having a full glass of water, protein shake, protein bar by 9am. May have a bar by 12pm or turkey, cheese, carrots or fruit.     She has been having right upper quadrant pain that radiates across her midline to her left shoulder. She did have a gallbladder ultrasound which did not show signs of gallbladder enlargement or changes to the biliary tree but did show hepatomegaly and some changes that could be related to fatty liver disease.  She has had 4 of these episodes. She doesn't tolerate processed carbohydrates well. She does tolerates green and high fiber veggies well in small amounts if cooked well.     She is taking protonix at 40mg, she is taking tums intermittently throughout the day as well.      Diet and Exercise: Diet history reviewed and discussed with the patient. Weight loss/gains to date discussed with the patient. The patient states they are eating 60 grams of protein per day. She reports eating  3 meals per day, a typical portion size of 1/2 cup, eating 1-2 snacks per day, drinking 5-6 or more 8-oz. glasses of water per day, no carbonated beverage consumption and exercising regularly.     Supplements: chewable bariatric MTV BID.     Review of Systems   Constitutional: Positive for appetite change. Negative for fatigue and unexpected weight change.   HENT: Positive for trouble swallowing (more than 2-2.5oz of solids, early satiety).    Eyes: Negative.    Respiratory: Negative.    Cardiovascular: Negative.  Negative for leg swelling.   Gastrointestinal: Negative for abdominal distention, abdominal pain, constipation, diarrhea, nausea and vomiting.        Heartburn, nocturnal reflux, nocturnal frothing   Genitourinary: Negative for difficulty urinating, frequency and urgency.   Musculoskeletal: Negative for back pain.   Skin: Negative.    Psychiatric/Behavioral: Negative.    All other systems reviewed and are negative.      Patient Active Problem List   Diagnosis   • Vitamin D deficiency   • Essential hypertension   • Dietary counseling   • Diabetes mellitus type 2 in obese   • Meniere disease   • Dyslipidemia   • ANNI (obstructive sleep apnea)   • Anxiety   • PCOS (polycystic ovarian syndrome)   • History of DVT (deep vein thrombosis)   • History of Helicobacter pylori infection   • Fatty liver   • GERD (gastroesophageal reflux disease)   • Multiple joint pain   • Quit smoking within past year   • Hiatal hernia   • Morbid obesity with BMI of 45.0-49.9, adult   • S/P laparoscopic sleeve gastrectomy   • Chronic fatigue   • Dysphagia   • Nausea and vomiting in adult patient       Past Medical History:   Diagnosis Date   • Anxiety and depression    • Arthritis    • Asthma    • Bleeds easily    • Colon polyp    • COPD (chronic obstructive pulmonary disease)    • Depression    • Diabetes mellitus    • Dizziness    • GERD (gastroesophageal reflux disease)    • Hiatal hernia    • Hidradenitis     HAS BOIL UNDER RT  ARM, PT INSTRUCTED TO CALL DR. LUZ'S OFFICE AND ALSO TO MONITOR   • History of Bell's palsy    • History of blood clots    • History of shingles    • Hypertension    • IBS (irritable bowel syndrome)    • Iron deficiency anemia    • PONV (postoperative nausea and vomiting)     PT STATES ALL PAIN MEDS CAUSE NAUSEA   • Sleep apnea     CPAP   • Spinal stenosis    • Stress incontinence    • Tachycardia        The following portions of the patient's history were reviewed and updated as appropriate: allergies, current medications, past family history, past medical history, past social history, past surgical history and problem list.    Vitals:    05/23/23 0909   BP: 119/77   Pulse: 66   Temp: 97.3 °F (36.3 °C)       Physical Exam  Vitals and nursing note reviewed.   Constitutional:       Appearance: She is well-developed. She is not diaphoretic.   Pulmonary:      Effort: Pulmonary effort is normal.   Neurological:      Mental Status: She is alert and oriented to person, place, and time.   Psychiatric:         Behavior: Behavior normal.         Assessment:   Post-op, the patient is struggling to advance her diet, get nutrition from solid foods, struggling with ongoing heartburn, reflux, nocturnal reflux and still has intermittent right upper quadrant pain that radiates across her chest and back.     Encounter Diagnoses   Name Primary?   • Morbid obesity with BMI of 45.0-49.9, adult Yes   • S/P laparoscopic sleeve gastrectomy    • Essential hypertension    • Vitamin D deficiency    • Diabetes mellitus type 2 in obese    • Gastroesophageal reflux disease, unspecified whether esophagitis present    • Hiatal hernia    • ANNI (obstructive sleep apnea)    • Chronic fatigue        Plan:   Proceed with HIDA scan to evaluate gb function. Repeat EGD with dilatation to further stretch pylorus to prevent reflux, early satiety and allow her to obtain more nutrition from solids.   Encouraged patient to be sure to get plenty of lean  protein per day through small frequent meals all with a protein source.   Activity restrictions: none.   Recommended patient be sure to get at least 70 grams of protein per day by eating small, frequent meals all with high lean protein choices. Be sure to limit/cut back on daily carbohydrate intake. Discussed with the patient the recommended amount of water per day to intake- half of body weight in ounces. Reviewed vitamin requirements. Be sure to do routine exercise, 150 minutes per week minimum, including both cardio and strength training.     Instructions / Recommendations: dietary counseling recommended, recommended a daily protein intake of  grams, vitamin supplement(s) recommended, recommended exercising at least 150 minutes per week, behavior modifications recommended and instructed to call the office for concerns, questions, or problems.     The patient was instructed to follow up in 3 months .   Total time spent during this encounter today was 35 minutes

## 2023-05-24 PROBLEM — R68.81 EARLY SATIETY: Status: ACTIVE | Noted: 2023-05-24

## 2023-05-24 PROBLEM — K21.00 REFLUX ESOPHAGITIS: Status: ACTIVE | Noted: 2023-05-24

## 2023-05-30 ENCOUNTER — ANESTHESIA EVENT (OUTPATIENT)
Dept: GASTROENTEROLOGY | Facility: HOSPITAL | Age: 48
End: 2023-05-30

## 2023-05-30 ENCOUNTER — ANESTHESIA (OUTPATIENT)
Dept: GASTROENTEROLOGY | Facility: HOSPITAL | Age: 48
End: 2023-05-30

## 2023-05-30 ENCOUNTER — HOSPITAL ENCOUNTER (OUTPATIENT)
Facility: HOSPITAL | Age: 48
Setting detail: HOSPITAL OUTPATIENT SURGERY
Discharge: HOME | End: 2023-05-30
Attending: SURGERY | Admitting: SURGERY

## 2023-05-30 VITALS
WEIGHT: 292 LBS | DIASTOLIC BLOOD PRESSURE: 73 MMHG | SYSTOLIC BLOOD PRESSURE: 116 MMHG | RESPIRATION RATE: 17 BRPM | HEIGHT: 65 IN | HEART RATE: 71 BPM | OXYGEN SATURATION: 97 % | BODY MASS INDEX: 48.65 KG/M2

## 2023-05-30 DIAGNOSIS — K21.00 REFLUX ESOPHAGITIS: ICD-10-CM

## 2023-05-30 DIAGNOSIS — K21.9 GASTROESOPHAGEAL REFLUX DISEASE, UNSPECIFIED WHETHER ESOPHAGITIS PRESENT: ICD-10-CM

## 2023-05-30 DIAGNOSIS — R68.81 EARLY SATIETY: ICD-10-CM

## 2023-05-30 DIAGNOSIS — R13.10 DYSPHAGIA, UNSPECIFIED TYPE: ICD-10-CM

## 2023-05-30 LAB — GLUCOSE BLDC GLUCOMTR-MCNC: 104 MG/DL (ref 70–130)

## 2023-05-30 PROCEDURE — 43239 EGD BIOPSY SINGLE/MULTIPLE: CPT | Performed by: SURGERY

## 2023-05-30 PROCEDURE — 88305 TISSUE EXAM BY PATHOLOGIST: CPT | Performed by: SURGERY

## 2023-05-30 PROCEDURE — 88312 SPECIAL STAINS GROUP 1: CPT | Performed by: SURGERY

## 2023-05-30 PROCEDURE — 87081 CULTURE SCREEN ONLY: CPT | Performed by: SURGERY

## 2023-05-30 PROCEDURE — 25010000002 PROPOFOL 10 MG/ML EMULSION: Performed by: ANESTHESIOLOGY

## 2023-05-30 PROCEDURE — 82948 REAGENT STRIP/BLOOD GLUCOSE: CPT

## 2023-05-30 PROCEDURE — 43245 EGD DILATE STRICTURE: CPT | Performed by: SURGERY

## 2023-05-30 PROCEDURE — C1726 CATH, BAL DIL, NON-VASCULAR: HCPCS | Performed by: SURGERY

## 2023-05-30 RX ORDER — PANTOPRAZOLE SODIUM 40 MG/1
40 TABLET, DELAYED RELEASE ORAL 2 TIMES DAILY
Qty: 60 TABLET | Refills: 5 | Status: SHIPPED | OUTPATIENT
Start: 2023-05-30

## 2023-05-30 RX ORDER — SODIUM CHLORIDE, SODIUM LACTATE, POTASSIUM CHLORIDE, CALCIUM CHLORIDE 600; 310; 30; 20 MG/100ML; MG/100ML; MG/100ML; MG/100ML
30 INJECTION, SOLUTION INTRAVENOUS CONTINUOUS
Status: DISCONTINUED | OUTPATIENT
Start: 2023-05-30 | End: 2023-05-30 | Stop reason: HOSPADM

## 2023-05-30 RX ORDER — PROPOFOL 10 MG/ML
VIAL (ML) INTRAVENOUS AS NEEDED
Status: DISCONTINUED | OUTPATIENT
Start: 2023-05-30 | End: 2023-05-30 | Stop reason: SURG

## 2023-05-30 RX ORDER — LIDOCAINE HYDROCHLORIDE 20 MG/ML
INJECTION, SOLUTION INFILTRATION; PERINEURAL AS NEEDED
Status: DISCONTINUED | OUTPATIENT
Start: 2023-05-30 | End: 2023-05-30 | Stop reason: SURG

## 2023-05-30 RX ORDER — SUCRALFATE 1 G/1
1 TABLET ORAL 4 TIMES DAILY
Qty: 120 TABLET | Refills: 0 | Status: SHIPPED | OUTPATIENT
Start: 2023-05-30

## 2023-05-30 RX ORDER — METOCLOPRAMIDE 10 MG/1
10 TABLET ORAL
Qty: 120 TABLET | Refills: 0 | Status: SHIPPED | OUTPATIENT
Start: 2023-05-30

## 2023-05-30 RX ADMIN — PROPOFOL 200 MG: 10 INJECTION, EMULSION INTRAVENOUS at 10:28

## 2023-05-30 RX ADMIN — LIDOCAINE HYDROCHLORIDE 60 MG: 20 INJECTION, SOLUTION INFILTRATION; PERINEURAL at 10:28

## 2023-05-30 RX ADMIN — SODIUM CHLORIDE, POTASSIUM CHLORIDE, SODIUM LACTATE AND CALCIUM CHLORIDE 30 ML/HR: 600; 310; 30; 20 INJECTION, SOLUTION INTRAVENOUS at 10:15

## 2023-05-30 RX ADMIN — PROPOFOL 200 MCG/KG/MIN: 10 INJECTION, EMULSION INTRAVENOUS at 10:28

## 2023-05-30 NOTE — OP NOTE
Surgeon: Tai Ribeiro Jr., M.D.    Preoperative Diagnosis: #1 GERD #2 status post laparoscopic sleeve gastrectomy and paraesophageal hernia repair    Postoperative Diagnosis: #1 gastritis #2 LA grade A esophagitis    Procedure Performed: Transoral esophagogastroduodenoscopy with 18-20 balloon dilatation of pylorus and cold forcep biopsies gastric antrum and distal esophagus    Indications: 47-year-old female status post laparoscopic sleeve gastrectomy and hiatal hernia repair with complaints of heartburn.  Patient underwent EGD with dilatation of the pylorus about a month ago and did have improvement in her nausea.  Patient still with heartburn symptoms.    Procedure:     The procedure, indications, preparation and potential, patient were explained to the patient, who indicated understanding and signed the corresponding consent forms.  The patient was identified, taken to the endoscopy suite, and placed on the left side down decubitus position.  The patient underwent a MAC anesthesia and was appropriately monitored through the case by the anesthesia personnel using continuous pulse oximetry, blood pressure, and cardiac monitoring.  A bite block was placed.  After adequate IV sedation and using a transoral technique a lubed flexible endoscope was placed in the hypopharynx and advanced to the second portion of the duodenum.  The pylorus was mildly strictured and the scope had to be advanced with some pressure into the duodenum.  The scope was then withdrawn back into the gastric sleeve.  There was no stricture noted in the gastric sleeve unless dictated below.  No polyps or ulcers were seen unless dictated below.  The scope was then withdrawn back into the esophagus.  The Z line was regular and no erosive esophagitis seen unless dictated below.  Scope was then advanced back down into the antrum.  A 18-20 balloon catheter was then advanced across the pylorus and taken up in sequence and each level held for  approximately 1 minute.  The catheter was deflated and removed.  The pylorus dilated up nicely.  The scope was then completely withdrawn after decompressing the stomach.  The patient tolerated the procedure well and left the endoscopy suite in stable condition.    The Z-line was irregular with 2 areas of erythema extending proximally less than 5 mm.  Multiple cold forcep biopsies taken to rule out Arreola's.  No active bleeding noted.  The sleeve was of normal size without stricture or dilatation.  The scope could not be retroflexed.  The pylorus was very snug.  The scope could be advanced with some pressure.  The pylorus was dilated with 18-20 balloon catheter.  Minimal patchy erythema noted in the gastric sleeve.  Cold forcep biopsies taken in the antrum to rule out Helicobacter pylori.    Recommendations:     Continue with Protonix and we will try to add Reglan.  Also add Carafate.  Await biopsies and follow-up in the office

## 2023-05-30 NOTE — ANESTHESIA POSTPROCEDURE EVALUATION
Patient: Kimberley Andrade    Procedure Summary     Date: 05/30/23 Room / Location:  [facility]    Anesthesia Start: 1024 Anesthesia Stop: 1040    Procedure: ESOPHAGOGASTRODUODENOSCOPY WITH 18-20mm balloon DILATATION (Esophagus) Diagnosis:       Reflux esophagitis      Gastroesophageal reflux disease, unspecified whether esophagitis present      Dysphagia, unspecified type      Early satiety      (Reflux esophagitis [K21.00])      (Gastroesophageal reflux disease, unspecified whether esophagitis present [K21.9])      (Dysphagia, unspecified type [R13.10])      (Early satiety [R68.81])    Surgeons: Tai Ribeiro Jr., MD Provider: Montez Gr MD    Anesthesia Type: MAC ASA Status: 3          Anesthesia Type: MAC    Vitals  Vitals Value Taken Time   /73 05/30/23 1053   Temp     Pulse 71 05/30/23 1053   Resp 17 05/30/23 1053   SpO2 97 % 05/30/23 1053           Post Anesthesia Care and Evaluation    Patient location during evaluation: PHASE II  Patient participation: complete - patient participated  Level of consciousness: awake and alert  Pain management: adequate    Airway patency: patent  Anesthetic complications: No anesthetic complications  PONV Status: none  Cardiovascular status: acceptable and hemodynamically stable  Respiratory status: acceptable, nonlabored ventilation and spontaneous ventilation  Hydration status: acceptable

## 2023-05-30 NOTE — ANESTHESIA PREPROCEDURE EVALUATION
Anesthesia Evaluation     Patient summary reviewed and Nursing notes reviewed   history of anesthetic complications: PONV  NPO Solid Status: > 8 hours  NPO Liquid Status: > 4 hours           Airway   Mallampati: III  TM distance: >3 FB  Neck ROM: full  Possible difficult intubation and Large neck circumference  Dental - normal exam     Pulmonary - normal exam   (+) a smoker Former, COPD, asthma,sleep apnea,   Cardiovascular - normal exam    ECG reviewed    (+) hypertension 2 medications or greater,       Neuro/Psych  (+) dizziness/light headedness, numbness, psychiatric history Anxiety and Depression,      ROS Comment: Hx Bell's palsy  GI/Hepatic/Renal/Endo    (+) obesity, morbid obesity, hiatal hernia, GERD,  liver disease fatty liver disease, diabetes mellitus type 2,     ROS Comment: Hx gastric sleeve and paraesophageal hernia repair    Musculoskeletal     Abdominal   (+) obese,    Substance History      OB/GYN      Comment: PCOS      Other   arthritis,                    Anesthesia Plan    ASA 3     MAC     intravenous induction     Anesthetic plan, risks, benefits, and alternatives have been provided, discussed and informed consent has been obtained with: patient.        CODE STATUS:

## 2023-05-31 LAB — UREASE TISS QL: NEGATIVE

## 2023-06-09 ENCOUNTER — OFFICE VISIT (OUTPATIENT)
Dept: BARIATRICS/WEIGHT MGMT | Facility: CLINIC | Age: 48
End: 2023-06-09
Payer: COMMERCIAL

## 2023-06-09 VITALS
HEART RATE: 73 BPM | WEIGHT: 293 LBS | BODY MASS INDEX: 48.82 KG/M2 | HEIGHT: 65 IN | TEMPERATURE: 98.2 F | DIASTOLIC BLOOD PRESSURE: 78 MMHG | SYSTOLIC BLOOD PRESSURE: 130 MMHG

## 2023-06-09 DIAGNOSIS — E66.9 DIABETES MELLITUS TYPE 2 IN OBESE: ICD-10-CM

## 2023-06-09 DIAGNOSIS — G47.33 OSA (OBSTRUCTIVE SLEEP APNEA): ICD-10-CM

## 2023-06-09 DIAGNOSIS — K21.00 GASTROESOPHAGEAL REFLUX DISEASE WITH ESOPHAGITIS WITHOUT HEMORRHAGE: ICD-10-CM

## 2023-06-09 DIAGNOSIS — E11.69 DIABETES MELLITUS TYPE 2 IN OBESE: ICD-10-CM

## 2023-06-09 DIAGNOSIS — E66.01 MORBID OBESITY WITH BMI OF 45.0-49.9, ADULT: Primary | ICD-10-CM

## 2023-06-09 DIAGNOSIS — I10 ESSENTIAL HYPERTENSION: ICD-10-CM

## 2023-06-09 PROCEDURE — 99214 OFFICE O/P EST MOD 30 MIN: CPT | Performed by: SURGERY

## 2023-06-09 PROCEDURE — 3075F SYST BP GE 130 - 139MM HG: CPT | Performed by: SURGERY

## 2023-06-09 PROCEDURE — 3078F DIAST BP <80 MM HG: CPT | Performed by: SURGERY

## 2023-06-09 PROCEDURE — 1160F RVW MEDS BY RX/DR IN RCRD: CPT | Performed by: SURGERY

## 2023-06-09 PROCEDURE — 1159F MED LIST DOCD IN RCRD: CPT | Performed by: SURGERY

## 2023-06-09 RX ORDER — ONDANSETRON 4 MG/1
4 TABLET, ORALLY DISINTEGRATING ORAL EVERY 4 HOURS PRN
Qty: 30 TABLET | Refills: 0 | Status: SHIPPED | OUTPATIENT
Start: 2023-06-09

## 2023-06-09 NOTE — PROGRESS NOTES
[facility] BARIATRIC SURGERY  [address] [facility] [address]  [facility] [address]  [phone]  [address] [address]  [phone]  Dept: [phone]  6/9/2023      Kimberley Andrade.  [id number]  [id number]  1975  female      Chief Complaint   Patient presents with    Follow-up     8 month follow up sleeve       BH Post-Op Bariatric Surgery:   Kimberley Andrade is status post Laparoscopic Sleeve/HH procedure, performed on 10/24/22     HPI:   Today's weight is 134 kg (296 lb) pounds, today's BMI is Body mass index is 49.26 kg/m²., a  loss of 1 pounds since the last visit and weight loss since surgery is 42 pounds.    Kimberley Andrade denies fever chills chest pain shortness of air abdominal pain and reports nauseawiththeareaorcertainproteinbars occasional regurgitation of saliva at times.  Also complains of constipation and fatigue.  Patient underwent upper endoscopy by me with dilatation of the pylorus which noted esophagitis.  Biopsies were negative for Arreola's or Candida.  Patient will continue with Protonix and Reglan was added which she states has helped some but still having reflux at night.  She with her daily routine and is trying to eat before 6:30 PM.  She has been snacking but the day when she is hungry and not eating formal meals.     Diet and Exercise: Diet history reviewed and discussed with the patient. Weight loss/gains to date discussed with the patient. The patient states they are eating around unknown grams of protein per day. She reports eating 2 meals per day, a typical portion size of 1 cup, eating 3 snacks per day, drinking 5 or more 8-oz. glasses of water per day, no carbonated beverage consumption and exercising regularly.     Supplements: Yes.     Review of Systems   Constitutional:  Positive for fatigue.   Gastrointestinal:  Positive for constipation, nausea and vomiting.   Musculoskeletal:  Positive for arthralgias and back  pain.   All other systems reviewed and are negative.    Patient Active Problem List   Diagnosis    Vitamin D deficiency    Essential hypertension    Dietary counseling    Diabetes mellitus type 2 in obese    Meniere disease    Dyslipidemia    ANNI (obstructive sleep apnea)    Anxiety    PCOS (polycystic ovarian syndrome)    History of DVT (deep vein thrombosis)    History of Helicobacter pylori infection    Fatty liver    GERD (gastroesophageal reflux disease)    Multiple joint pain    Hiatal hernia    Morbid obesity with BMI of 45.0-49.9, adult    S/P laparoscopic sleeve gastrectomy    Chronic fatigue    Dysphagia    Nausea and vomiting in adult patient    Reflux esophagitis    Early satiety       Past Medical History:   Diagnosis Date    Anxiety and depression     Arthritis     Asthma     Bell's palsy     Bleeds easily     Colon polyp     COPD (chronic obstructive pulmonary disease)     Depression     Diabetes mellitus     Dizziness     GERD (gastroesophageal reflux disease)     Hiatal hernia     Hidradenitis     HAS BOIL UNDER RT ARM, PT INSTRUCTED TO CALL DR. LUZ'S OFFICE AND ALSO TO MONITOR    History of Bell's palsy     History of blood clots     History of shingles     Hypertension     IBS (irritable bowel syndrome)     Iron deficiency anemia     Nodule of kidney     PONV (postoperative nausea and vomiting)     PT STATES ALL PAIN MEDS CAUSE NAUSEA    Sleep apnea     CPAP    Spinal stenosis     Stress incontinence     Tachycardia        The following portions of the patient's history were reviewed and updated as appropriate: allergies, current medications, past family history, past medical history, past social history, past surgical history, and problem list.    Vitals:    06/09/23 0856   BP: 130/78   Pulse: 73   Temp: 98.2 °F (36.8 °C)       Physical Exam  Constitutional:       Appearance: Normal appearance.   HENT:      Head: Normocephalic and atraumatic.   Eyes:      Conjunctiva/sclera: Conjunctivae  normal.   Pulmonary:      Effort: Pulmonary effort is normal.   Neurological:      Mental Status: She is alert and oriented to person, place, and time.   Psychiatric:         Mood and Affect: Mood normal.         Behavior: Behavior normal.         Thought Content: Thought content normal.         Judgment: Judgment normal.       Assessment:   Post-op, the patient 8 months status post laparoscopic sleeve gastrectomy and hiatal hernia repair October 2022 who underwent upper endoscopy with dilatation of the pylorus by me revealing esophagitis.  Biopsies were negative for Arreola's.  Had long discussion regarding clean eating concentrating on meals only and no snacking.  Discussed eating early has been redoing and not eating anything after dinner.  She will continue with the Reglan for now and will use Zofran for nausea but I think there are certain foods that is causing the issues which we will stay away from.  Continue with her exercise routine.  She does not drink her calories except for meal replacement shakes at times.  Ultrasound gallbladder was negative.  HIDA scan has been ordered.  We will order upper GI to evaluate hiatal hernia and to see if there is recurrence.  She quit smoking before her gastric sleeve but  does smoke.  We did discuss possible conversion to gastric bypass if symptoms do not resolve or if there is no hiatal hernia.  All of her questions were answered.  Follow-up after testing.     Encounter Diagnoses   Name Primary?    Morbid obesity with BMI of 45.0-49.9, adult Yes    Gastroesophageal reflux disease with esophagitis without hemorrhage     Diabetes mellitus type 2 in obese     Essential hypertension     ANNI (obstructive sleep apnea)        Plan:     Encouraged patient to be sure to get plenty of lean protein per day through small meals all with a protein source.   Activity restrictions: none.   Recommended patient be sure to get at least 70 grams of protein per day by eating small   meals all with high lean protein choices. Be sure to limit/cut back on daily carbohydrate intake. Discussed with the patient the recommended amount of water per day to intake- half of body weight in ounces. Reviewed vitamin requirements. Be sure to do routine exercise, 150 minutes per week minimum, including both cardio and strength training.     Instructions / Recommendations: dietary counseling recommended, recommended a daily protein intake of  grams, vitamin supplement(s) recommended, recommended exercising at least 150 minutes per week, behavior modifications recommended and instructed to call the office for concerns, questions, or problems.     The patient was instructed to follow up in 1 to 2 months.     The patient was counseled regarding the above procedure. Total time spent on this encounter was  30 minutes including reviewing previous notes, lab results and face to face time spent with the patient.  The majority of time was spent counseling the patient regarding diet and exercise as well as reviewing their medications and their compliance with the procedure.

## 2023-06-17 ENCOUNTER — HOSPITAL ENCOUNTER (EMERGENCY)
Facility: HOSPITAL | Age: 48
Discharge: HOME | End: 2023-06-17
Attending: STUDENT IN AN ORGANIZED HEALTH CARE EDUCATION/TRAINING PROGRAM
Payer: COMMERCIAL

## 2023-06-17 VITALS
HEART RATE: 75 BPM | DIASTOLIC BLOOD PRESSURE: 68 MMHG | RESPIRATION RATE: 20 BRPM | SYSTOLIC BLOOD PRESSURE: 158 MMHG | HEIGHT: 65 IN | OXYGEN SATURATION: 98 % | TEMPERATURE: 98.1 F | WEIGHT: 293 LBS | BODY MASS INDEX: 48.82 KG/M2

## 2023-06-17 DIAGNOSIS — R11.2 NAUSEA AND VOMITING, UNSPECIFIED VOMITING TYPE: ICD-10-CM

## 2023-06-17 DIAGNOSIS — H66.90 ACUTE OTITIS MEDIA, UNSPECIFIED OTITIS MEDIA TYPE: Primary | ICD-10-CM

## 2023-06-17 LAB
ALBUMIN SERPL-MCNC: 4 G/DL (ref 3.5–5.2)
ALBUMIN/GLOB SERPL: 1.6 G/DL
ALP SERPL-CCNC: 89 U/L (ref 39–117)
ALT SERPL W P-5'-P-CCNC: 17 U/L (ref 1–33)
ANION GAP SERPL CALCULATED.3IONS-SCNC: 8.6 MMOL/L (ref 5–15)
AST SERPL-CCNC: 13 U/L (ref 1–32)
BASOPHILS # BLD AUTO: 0.02 10*3/MM3 (ref 0–0.2)
BASOPHILS NFR BLD AUTO: 0.2 % (ref 0–1.5)
BILIRUB SERPL-MCNC: 0.6 MG/DL (ref 0–1.2)
BUN SERPL-MCNC: 14 MG/DL (ref 6–20)
BUN/CREAT SERPL: 23.7 (ref 7–25)
CALCIUM SPEC-SCNC: 9.3 MG/DL (ref 8.6–10.5)
CHLORIDE SERPL-SCNC: 107 MMOL/L (ref 98–107)
CO2 SERPL-SCNC: 21.4 MMOL/L (ref 22–29)
CREAT SERPL-MCNC: 0.59 MG/DL (ref 0.57–1)
DEPRECATED RDW RBC AUTO: 42.9 FL (ref 37–54)
EGFRCR SERPLBLD CKD-EPI 2021: 111.3 ML/MIN/1.73
EOSINOPHIL # BLD AUTO: 0.13 10*3/MM3 (ref 0–0.4)
EOSINOPHIL NFR BLD AUTO: 1.5 % (ref 0.3–6.2)
ERYTHROCYTE [DISTWIDTH] IN BLOOD BY AUTOMATED COUNT: 13 % (ref 12.3–15.4)
GLOBULIN UR ELPH-MCNC: 2.5 GM/DL
GLUCOSE SERPL-MCNC: 115 MG/DL (ref 65–99)
HCT VFR BLD AUTO: 42.5 % (ref 34–46.6)
HGB BLD-MCNC: 14.6 G/DL (ref 12–15.9)
IMM GRANULOCYTES # BLD AUTO: 0.02 10*3/MM3 (ref 0–0.05)
IMM GRANULOCYTES NFR BLD AUTO: 0.2 % (ref 0–0.5)
LIPASE SERPL-CCNC: 33 U/L (ref 13–60)
LYMPHOCYTES # BLD AUTO: 2.54 10*3/MM3 (ref 0.7–3.1)
LYMPHOCYTES NFR BLD AUTO: 28.7 % (ref 19.6–45.3)
MCH RBC QN AUTO: 30.7 PG (ref 26.6–33)
MCHC RBC AUTO-ENTMCNC: 34.4 G/DL (ref 31.5–35.7)
MCV RBC AUTO: 89.5 FL (ref 79–97)
MONOCYTES # BLD AUTO: 0.53 10*3/MM3 (ref 0.1–0.9)
MONOCYTES NFR BLD AUTO: 6 % (ref 5–12)
NEUTROPHILS NFR BLD AUTO: 5.61 10*3/MM3 (ref 1.7–7)
NEUTROPHILS NFR BLD AUTO: 63.4 % (ref 42.7–76)
PLATELET # BLD AUTO: 225 10*3/MM3 (ref 140–450)
PMV BLD AUTO: 10.5 FL (ref 6–12)
POTASSIUM SERPL-SCNC: 3.8 MMOL/L (ref 3.5–5.2)
PROT SERPL-MCNC: 6.5 G/DL (ref 6–8.5)
RBC # BLD AUTO: 4.75 10*6/MM3 (ref 3.77–5.28)
SODIUM SERPL-SCNC: 137 MMOL/L (ref 136–145)
WBC NRBC COR # BLD: 8.85 10*3/MM3 (ref 3.4–10.8)

## 2023-06-17 PROCEDURE — 83690 ASSAY OF LIPASE: CPT | Performed by: STUDENT IN AN ORGANIZED HEALTH CARE EDUCATION/TRAINING PROGRAM

## 2023-06-17 PROCEDURE — 85025 COMPLETE CBC W/AUTO DIFF WBC: CPT | Performed by: STUDENT IN AN ORGANIZED HEALTH CARE EDUCATION/TRAINING PROGRAM

## 2023-06-17 PROCEDURE — 25010000002 ONDANSETRON PER 1 MG: Performed by: STUDENT IN AN ORGANIZED HEALTH CARE EDUCATION/TRAINING PROGRAM

## 2023-06-17 PROCEDURE — 80053 COMPREHEN METABOLIC PANEL: CPT | Performed by: STUDENT IN AN ORGANIZED HEALTH CARE EDUCATION/TRAINING PROGRAM

## 2023-06-17 RX ORDER — ONDANSETRON 2 MG/ML
4 INJECTION INTRAMUSCULAR; INTRAVENOUS ONCE
Status: COMPLETED | OUTPATIENT
Start: 2023-06-17 | End: 2023-06-17

## 2023-06-17 RX ORDER — SODIUM CHLORIDE 0.9 % (FLUSH) 0.9 %
10 SYRINGE (ML) INJECTION AS NEEDED
Status: DISCONTINUED | OUTPATIENT
Start: 2023-06-17 | End: 2023-06-17 | Stop reason: HOSPADM

## 2023-06-17 RX ORDER — AMOXICILLIN AND CLAVULANATE POTASSIUM 875; 125 MG/1; MG/1
1 TABLET, FILM COATED ORAL 2 TIMES DAILY
Qty: 20 TABLET | Refills: 0 | Status: SHIPPED | OUTPATIENT
Start: 2023-06-17 | End: 2023-06-27

## 2023-06-17 RX ORDER — AMOXICILLIN AND CLAVULANATE POTASSIUM 875; 125 MG/1; MG/1
1 TABLET, FILM COATED ORAL ONCE
Status: COMPLETED | OUTPATIENT
Start: 2023-06-17 | End: 2023-06-17

## 2023-06-17 RX ADMIN — Medication 10 ML: at 08:53

## 2023-06-17 RX ADMIN — LIDOCAINE HYDROCHLORIDE: 20 SOLUTION ORAL; TOPICAL at 08:53

## 2023-06-17 RX ADMIN — AMOXICILLIN AND CLAVULANATE POTASSIUM 1 TABLET: 875; 125 TABLET, FILM COATED ORAL at 08:53

## 2023-06-17 RX ADMIN — ONDANSETRON 4 MG: 2 INJECTION INTRAMUSCULAR; INTRAVENOUS at 08:53

## 2023-06-17 NOTE — Clinical Note
[facility]  [address]  [facility] [address]    Kimberley Andrade was seen and treated in our emergency department on 6/17/2023.  She may return to work on 06/19/2023.         Thank you for choosing [facility].    Maria L Mcgill RN

## 2023-06-17 NOTE — FSED PROVIDER NOTE
Subjective   History of Present Illness  47-year-old female with past medical history of asthma, diabetes, history of gastric sleeve presents to the emergency department with left ear pain.  She reports pain started yesterday.  She reports pain is 10 out of 10.  She has tried over-the-counter Tylenol with minimal relief.  She reports a mild sore throat as well.  No known sick contacts or recent travel.  She notes she has had issues with vomiting and abdominal pain since her gastric sleeve.  She reports her surgeon is considering a revision to a gastric bypass.  She states the vomiting has been worse since the pain started, however the abdominal pain has been constant and has not worsened in severity or change in character.    History provided by:  Patient    Review of Systems   Constitutional:  Negative for chills and fever.   HENT:  Positive for ear pain. Negative for congestion and sore throat.    Eyes:  Negative for pain and redness.   Respiratory:  Negative for cough and shortness of breath.    Cardiovascular:  Negative for chest pain and palpitations.   Gastrointestinal:  Positive for abdominal pain and vomiting. Negative for nausea.   Genitourinary:  Negative for difficulty urinating and dysuria.   Musculoskeletal:  Negative for back pain and neck pain.   Skin:  Negative for rash and wound.   Neurological:  Negative for weakness, numbness and headaches.   All other systems reviewed and are negative.    Past Medical History:   Diagnosis Date    Anxiety and depression     Arthritis     Asthma     Bell's palsy     Bleeds easily     Colon polyp     COPD (chronic obstructive pulmonary disease)     Depression     Diabetes mellitus     Dizziness     GERD (gastroesophageal reflux disease)     Hiatal hernia     Hidradenitis     HAS BOIL UNDER RT ARM, PT INSTRUCTED TO CALL DR. LUZ'S OFFICE AND ALSO TO MONITOR    History of Bell's palsy     History of blood clots     History of shingles     Hypertension     IBS  (irritable bowel syndrome)     Iron deficiency anemia     Nodule of kidney     PONV (postoperative nausea and vomiting)     PT STATES ALL PAIN MEDS CAUSE NAUSEA    Sleep apnea     CPAP    Spinal stenosis     Stress incontinence     Tachycardia        Allergies   Allergen Reactions    Clindamycin Swelling     Lip swelling      Codeine Swelling     LIP SWELLING    Erythromycin Swelling     LIP SWELLING    Vancomycin Swelling     LIP SWELLING    Adhesive Tape Rash     SILK TAPE    Ibuprofen Nausea And Vomiting and Rash       Past Surgical History:   Procedure Laterality Date    AXILLARY HIDRADENITIS EXCISION      multiple    BREAST LUMPECTOMY Right     BENIGN    CERVICAL CERCLAGE       SECTION      x 3     COLONOSCOPY      DENTAL PROCEDURE      SEVERAL TEETH REMOVED INCLUDING WISDOM TEETH    DIAGNOSTIC LAPAROSCOPY      DILATATION AND CURETTAGE      ENDOMETRIAL ABLATION      ENDOSCOPY N/A 2022    Procedure: ESOPHAGOGASTRODUODENOSCOPY WITH BIOPSY;  Surgeon: Tai Ribeiro Jr., MD;  Location: [facility] ENDOSCOPY;  Service: General;  Laterality: N/A;  PRE- GERD, HIATAL HERNIA  POST- GASTRITIS, ESOPHAGITIS, HIATAL HERNIA    ENDOSCOPY N/A 3/3/2023    Procedure: ESOPHAGOGASTRODUODENOSCOPY WITH BALLOON DILATATION #20;  Surgeon: Tai Ribeiro Jr., MD;  Location: [facility] ENDOSCOPY;  Service: General;  Laterality: N/A;  PRE OP -DYSPHAGIA, H/O GASTRIC SLEEVE  POST OP - GASTRITIS    ENDOSCOPY N/A 2023    Procedure: ESOPHAGOGASTRODUODENOSCOPY WITH 18-20mm balloon DILATATION;  Surgeon: Tai Ribeiro Jr., MD;  Location: [facility] ENDOSCOPY;  Service: General;  Laterality: N/A;  PRE- GERD, H/O SLEEVE  POST- GASTRITIS, ESOPHAGITIS    GASTRIC SLEEVE LAPAROSCOPIC N/A 10/24/2022    Procedure: GASTRIC SLEEVE LAPAROSCOPIC With  PARAESPHAGEAL Hernia Repair, LYSIS OF ADHESIONS;  Surgeon: Tai Ribeiro Jr., MD;  Location: [facility] OR [facility];  Service: Bariatric;  Laterality: N/A;    HYSTERECTOMY      ALONG WITH  LEFT OVARY    INCISIONAL HERNIA REPAIR      recurent hernia repair    MOUTH SURGERY      TUBAL ABDOMINAL LIGATION      VENTRAL HERNIA REPAIR         Family History   Problem Relation Age of Onset    Obesity Mother     Diabetes Mother     Lung cancer Mother     Obesity Father     Obesity Maternal Grandmother     Stroke Maternal Grandmother     Heart attack Maternal Grandmother     Hypertension Paternal Grandmother     Stroke Paternal Grandmother     Heart attack Paternal Grandmother     Malig Hyperthermia Neg Hx        Social History     Socioeconomic History    Marital status:     Number of children: 3   Tobacco Use    Smoking status: Former     Packs/day: 0.50     Years: 22.00     Pack years: 11.00     Types: Cigarettes     Quit date: 2022     Years since quittin.4    Smokeless tobacco: Never    Tobacco comments:     STARTED AGE 17   Vaping Use    Vaping Use: Never used   Substance and Sexual Activity    Alcohol use: Yes     Comment: rare    Drug use: Never    Sexual activity: Defer           Objective   Physical Exam  Vitals and nursing note reviewed.   Constitutional:       General: She is not in acute distress.     Appearance: Normal appearance.   HENT:      Head: Normocephalic and atraumatic.      Right Ear: Tympanic membrane and ear canal normal.      Left Ear: Ear canal normal.      Ears:      Comments: Erythematous left TM     Mouth/Throat:      Mouth: Mucous membranes are moist.   Eyes:      Extraocular Movements: Extraocular movements intact.      Conjunctiva/sclera: Conjunctivae normal.      Pupils: Pupils are equal, round, and reactive to light.   Cardiovascular:      Rate and Rhythm: Normal rate and regular rhythm.      Pulses: Normal pulses.      Heart sounds: Normal heart sounds.   Pulmonary:      Effort: Pulmonary effort is normal. No respiratory distress.      Breath sounds: Normal breath sounds.   Abdominal:      General: There is no distension.      Palpations: Abdomen is soft.       Tenderness: There is no abdominal tenderness.      Comments: Mild epigastric tenderness to palpation.  No rebound.  No guarding.   Musculoskeletal:         General: Normal range of motion.      Cervical back: Normal range of motion and neck supple.   Skin:     General: Skin is warm.   Neurological:      General: No focal deficit present.      Mental Status: She is alert.   Psychiatric:         Mood and Affect: Mood normal.         Behavior: Behavior normal.       Procedures           ED Course  ED Course as of 06/17/23 0933   Sat Jun 17, 2023   0932 Discussed all results with the patient at bedside.  She reports significant improvement in symptoms.  She has a benign abdominal exam at time of discharge.  Discussed with her the importance of taking her antibiotics for the full course for her otitis media.  Counseled to follow-up closely with her primary care physician and her surgeon.  Return to the ED for any new or worsening symptoms.  Patient expressed understanding and agreement with this plan.  Patient discharged home. [JS]      ED Course User Index  [JS] Mk Lawrence MD                                           Medical Decision Making  47-year-old female presents the emergency department with ear pain, vomiting, and abdominal pain.  Abdominal pain is consistent with previous and unchanged.  Differential diagnosis includes otitis media, electrolyte abnormality, dehydration, others.  Patient does not appear to have a surgical abdomen at this time.  Medication for nausea, GI cocktail, and Augmentin given.    Problems Addressed:  Acute otitis media, unspecified otitis media type: complicated acute illness or injury  Nausea and vomiting, unspecified vomiting type: complicated acute illness or injury    Amount and/or Complexity of Data Reviewed  Labs: ordered.    Risk  Prescription drug management.        Final diagnoses:   Acute otitis media, unspecified otitis media type   Nausea and vomiting, unspecified  vomiting type       ED Disposition  ED Disposition       ED Disposition   Discharge    Condition   Stable    Comment   --               Lucila Tirado MD  [address] [place] [address], [address]  [facility] [address] [phone]    Schedule an appointment as soon as possible for a visit in 3 days      [facility]  [address] [facility] [address]    As needed, If symptoms worsen         Medication List        New Prescriptions      amoxicillin-clavulanate 875-125 MG per tablet  Commonly known as: AUGMENTIN  Take 1 tablet by mouth 2 (Two) Times a Day for 10 days.            Changed      metFORMIN 1000 MG tablet  Commonly known as: GLUCOPHAGE  Take 1 tablet by mouth 2 (Two) Times a Day With Meals. TAKE 1/2 DOSE WHILE ON LIQUID DIET  What changed: how much to take               Where to Get Your Medications        These medications were sent to [id number] - [place], [place] - [address] [address] [facility] - [phone]  - [phone]   [address] [facility] [address]      Phone: [phone]   amoxicillin-clavulanate 875-125 MG per tablet

## 2023-08-01 ENCOUNTER — TELEPHONE (OUTPATIENT)
Dept: BARIATRICS/WEIGHT MGMT | Facility: CLINIC | Age: 48
End: 2023-08-01
Payer: COMMERCIAL

## 2023-08-03 ENCOUNTER — OFFICE VISIT (OUTPATIENT)
Dept: BARIATRICS/WEIGHT MGMT | Facility: CLINIC | Age: 48
End: 2023-08-03
Payer: COMMERCIAL

## 2023-08-03 VITALS
HEIGHT: 65 IN | BODY MASS INDEX: 48.82 KG/M2 | DIASTOLIC BLOOD PRESSURE: 85 MMHG | TEMPERATURE: 97.8 F | WEIGHT: 293 LBS | HEART RATE: 68 BPM | SYSTOLIC BLOOD PRESSURE: 140 MMHG

## 2023-08-03 DIAGNOSIS — R13.10 DYSPHAGIA, UNSPECIFIED TYPE: ICD-10-CM

## 2023-08-03 DIAGNOSIS — Z98.84 S/P LAPAROSCOPIC SLEEVE GASTRECTOMY: ICD-10-CM

## 2023-08-03 DIAGNOSIS — K21.9 GASTROESOPHAGEAL REFLUX DISEASE, UNSPECIFIED WHETHER ESOPHAGITIS PRESENT: Primary | ICD-10-CM

## 2023-08-03 DIAGNOSIS — Z71.3 DIETARY COUNSELING: ICD-10-CM

## 2023-08-03 DIAGNOSIS — E66.01 MORBID OBESITY WITH BMI OF 45.0-49.9, ADULT: ICD-10-CM

## 2023-08-24 ENCOUNTER — HOSPITAL ENCOUNTER (OUTPATIENT)
Dept: GENERAL RADIOLOGY | Facility: HOSPITAL | Age: 48
Discharge: HOME | End: 2023-08-24
Admitting: SURGERY
Payer: COMMERCIAL

## 2023-08-24 PROCEDURE — 74246 X-RAY XM UPR GI TRC 2CNTRST: CPT

## 2023-08-24 RX ADMIN — ANTACID/ANTIFLATULENT 1 PACKET: 380; 550; 10; 10 GRANULE, EFFERVESCENT ORAL at 07:45

## 2023-08-24 RX ADMIN — BARIUM SULFATE 135 ML: 980 POWDER, FOR SUSPENSION ORAL at 07:45

## 2023-09-26 ENCOUNTER — OFFICE (OUTPATIENT)
Dept: URBAN - METROPOLITAN AREA CLINIC 76 | Facility: CLINIC | Age: 48
End: 2023-09-26
Payer: COMMERCIAL

## 2023-09-26 VITALS — HEIGHT: 65 IN | WEIGHT: 288 LBS

## 2023-09-26 DIAGNOSIS — R10.13 EPIGASTRIC PAIN: ICD-10-CM

## 2023-09-26 DIAGNOSIS — K21.9 GASTRO-ESOPHAGEAL REFLUX DISEASE WITHOUT ESOPHAGITIS: ICD-10-CM

## 2023-09-26 DIAGNOSIS — R19.4 CHANGE IN BOWEL HABIT: ICD-10-CM

## 2023-09-26 PROCEDURE — 99204 OFFICE O/P NEW MOD 45 MIN: CPT | Performed by: INTERNAL MEDICINE

## 2024-01-15 RX ORDER — PANTOPRAZOLE SODIUM 40 MG/1
40 TABLET, DELAYED RELEASE ORAL DAILY
Qty: 30 TABLET | OUTPATIENT
Start: 2024-01-15

## 2024-10-25 ENCOUNTER — TELEMEDICINE (OUTPATIENT)
Dept: BARIATRICS/WEIGHT MGMT | Facility: CLINIC | Age: 49
End: 2024-10-25
Payer: COMMERCIAL

## 2024-10-25 DIAGNOSIS — R13.10 DYSPHAGIA, UNSPECIFIED TYPE: ICD-10-CM

## 2024-10-25 DIAGNOSIS — Z98.84 S/P LAPAROSCOPIC SLEEVE GASTRECTOMY: ICD-10-CM

## 2024-10-25 DIAGNOSIS — E66.01 MORBID OBESITY WITH BMI OF 45.0-49.9, ADULT: Primary | ICD-10-CM

## 2024-10-25 DIAGNOSIS — K21.9 GASTROESOPHAGEAL REFLUX DISEASE, UNSPECIFIED WHETHER ESOPHAGITIS PRESENT: ICD-10-CM

## 2024-10-25 DIAGNOSIS — R11.0 NAUSEA: ICD-10-CM

## 2024-10-28 NOTE — PROGRESS NOTES
"Metabolic and Bariatric Surgery Nutrition Follow Up    Patient Name: Kimberley Andrade    YOB: 1975   Age: 48 y.o.  Sex: female  MRN: [id number]     Patient presents today for telehealth service. This service was conducted via [employer] video visit. This provider is located at home. Patient is being seen remotely at home.    You have chosen to receive care through a telehealth visit. Do you consent to use a video/audio connection for your care today? Yes  The visit included audio and video interaction. No technical issues occurred during this visit     ASSESSMENT    Procedure Performed: Sleeve  Date of Surgery: 10/24/2022    Anthropometrics  Estimated body mass index is 48.92 kg/m² as calculated from the following:    Height as of 8/3/23: 165.1 cm (65\").    Weight as of 8/3/23: 133 kg (294 lb).    Surgery weight: 338 lbs  Weight change since surgery: recent weight unavailable    Kimberley Andarde is status post sleeve procedure and presents for nutrition follow up. Last visit was 8/3/23. Patient reports they are experiencing nausea with an intense feeling of needing to vomit. This lasts 10-15 minutes after taking a bite of food. This issue has been ongoing and has discussed conversion to gastric bypass with [facility], but has been unable to quit smoking. Patient states she is \"almost tobacco free\" and has been taking steps to address anxiety. She has started on a new medication and is hopeful.  Diet history reviewed and discussed with the patient. Weight loss/gains to date discussed with the patient. Patient states weight is in the 230's now.  Has to remind self to eat and dreads eating. She is frustrated with continuing to feel bad and nothing helps, but she is struggling to quit smoking.   Exercise: walking on treadmill 2-3 times per week    Diet Review   Patient is eating 2-3 meals and 2-3 snacks daily      24 Hour Recall   Breakfast: Premier protein plus 1 tbsp applesauce with medications    Lunch: Eggs or " "leftovers    Dinner: Meat, vegetables and starch (small portion)    Snacks: Cucumbers, grapes, [employer], Cheez-Its    Beverages: \"Nothing with sugar\"      DIAGNOSIS    Nutrition problem statement: Obesity related to multifactorial biochemical, behavioral and environmental contributors to disease as evidenced by BMI 48.92 kg/m².    INTERVENTION    I reviewed the appropriate dietary choices with the patient and provided guidance and suggestions for making necessary changes. Recommend trying smaller, more frequent meals with protein source. Focus on eating protein first and aim for minimum of 70-80 grams per day. Limit or weigh/measure portions sizes for high fat and calorie foods including nuts. Discussed bariatric portion plate model for guidelines on putting together balanced meals. Use small plate and eat protein first, then vegetables, then starches. Suggested the option of keeping a food journal which will potentially help identify foods that make symptoms worse. Aim for at least 64 oz water daily and wait 30 minutes after eating before drinking. Reviewed vitamin requirements. Be sure to do routine exercise, 150 minutes per week minimum, including both cardio and strength training. Offered follow up for support and accountability.      MONITORING & EVALUATION    Patient goals:   Schedule follow up with [facility]  Focus on small, frequent meals  [place] diet, limit greasy and spicy foods    Follow-Up:  as needed     Total time spent during this encounter today exceeded 35 minutes and includes preparing for the visit, reviewing tests, counseling and educating the patient/family/caregiver and documenting information in the medical record.    Electronically signed by:  Hillary Nick RD   10/28/24 15:08 EDT  "

## (undated) DEVICE — TUBING, SUCTION, 1/4" X 10', STRAIGHT: Brand: MEDLINE

## (undated) DEVICE — LN SMPL CO2 SHTRM SD STREAM W/M LUER

## (undated) DEVICE — ESOPHAGEAL BALLOON DILATATION CATHETER: Brand: CRE FIXED WIRE

## (undated) DEVICE — CONN TBG Y 5 IN 1 LF STRL

## (undated) DEVICE — KT ORCA ORCAPOD DISP STRL

## (undated) DEVICE — GLV SURG SENSICARE PI MIC PF SZ8.5 LF STRL

## (undated) DEVICE — TROC STANDARDTROCAR FOR/TITANSGS 19MM DISP STRL

## (undated) DEVICE — GLV SURG SENSICARE PI PF LF 8.5 GRN STRL

## (undated) DEVICE — FRCP BX RADJAW4 NDL 2.8 240CM LG OG BX40

## (undated) DEVICE — SOL NACL 0.9PCT 1000ML

## (undated) DEVICE — ENSEAL LAPAROSCOPIC TISSUE SEALER G2 ARTICULATING CURVED JAW FOR USE WITH G2 GENERATOR 5MM DIAMETER 45CM SHAFT LENGTH: Brand: ENSEAL

## (undated) DEVICE — BITEBLOCK OMNI BLOC

## (undated) DEVICE — PK OSC LAP SLV 40

## (undated) DEVICE — SYR LUERLOK 20CC BX/50

## (undated) DEVICE — CONTAINER,SPECIMEN,OR STERILE,4OZ: Brand: MEDLINE

## (undated) DEVICE — MSK PROC CURAPLEX O2 2/ADAPT 7FT

## (undated) DEVICE — LAPAROSCOPIC SMOKE FILTRATION SYSTEM: Brand: PALL LAPAROSHIELD® PLUS LAPAROSCOPIC SMOKE FILTRATION SYSTEM

## (undated) DEVICE — DISPOSABLE MONOPOLAR ENDOSCOPIC CORD 10 FT. (3M): Brand: KIRWAN

## (undated) DEVICE — GLV SURG SENSICARE PI MIC PF SZ6 LF STRL

## (undated) DEVICE — 500ML,PRESSURE INFUSER W/STOPCOCK: Brand: MEDLINE

## (undated) DEVICE — SENSR O2 OXIMAX FNGR A/ 18IN NONSTR

## (undated) DEVICE — VIOLET BRAIDED (POLYGLACTIN 910), SYNTHETIC ABSORBABLE SUTURE: Brand: COATED VICRYL

## (undated) DEVICE — MARKR SKIN W/RULR AND LBL

## (undated) DEVICE — APL DUPLOSPRAYER MIS 40CM

## (undated) DEVICE — ADAPT CLN BIOGUARD AIR/H2O DISP

## (undated) DEVICE — LAPAROVUE VISIBILITY SYSTEM LAPAROSCOPIC SOLUTIONS: Brand: LAPAROVUE

## (undated) DEVICE — DECANTER BAG 9": Brand: MEDLINE INDUSTRIES, INC.

## (undated) DEVICE — DEV INFL CRE STERIFLATE 60CC DISP

## (undated) DEVICE — Device: Brand: STANDARD BOUGIE, 38FR

## (undated) DEVICE — NDL HYPO PRECISIONGLIDE REG 20G 1 1/2

## (undated) DEVICE — LAPAROSCOPIC DISSECTOR: Brand: DEROYAL

## (undated) DEVICE — TROCAR: Brand: KII OPTICAL ACCESS SYSTEM

## (undated) DEVICE — DEV INFL ALLIANCE2 SYS

## (undated) DEVICE — GLV SURG SENSICARE POLYISPRN W/ALOE PF LF 6.5 GRN STRL